# Patient Record
Sex: MALE | Race: BLACK OR AFRICAN AMERICAN | NOT HISPANIC OR LATINO | Employment: OTHER | URBAN - METROPOLITAN AREA
[De-identification: names, ages, dates, MRNs, and addresses within clinical notes are randomized per-mention and may not be internally consistent; named-entity substitution may affect disease eponyms.]

---

## 2020-10-25 ENCOUNTER — HOSPITAL ENCOUNTER (EMERGENCY)
Facility: HOSPITAL | Age: 45
Discharge: HOME | End: 2020-10-25
Attending: EMERGENCY MEDICINE
Payer: MEDICAID

## 2020-10-25 VITALS
OXYGEN SATURATION: 100 % | TEMPERATURE: 100 F | WEIGHT: 159.5 LBS | HEART RATE: 82 BPM | SYSTOLIC BLOOD PRESSURE: 136 MMHG | BODY MASS INDEX: 26.57 KG/M2 | DIASTOLIC BLOOD PRESSURE: 82 MMHG | HEIGHT: 65 IN | RESPIRATION RATE: 20 BRPM

## 2020-10-25 DIAGNOSIS — M79.601 ARM PAIN, RIGHT: ICD-10-CM

## 2020-10-25 DIAGNOSIS — S42.411A RIGHT SUPRACONDYLAR HUMERUS FRACTURE, CLOSED, INITIAL ENCOUNTER: Primary | ICD-10-CM

## 2020-10-25 PROCEDURE — 99283 EMERGENCY DEPT VISIT LOW MDM: CPT | Mod: 25

## 2020-10-25 PROCEDURE — 29105 APPLICATION LONG ARM SPLINT: CPT | Mod: RT

## 2020-10-25 PROCEDURE — 25000003 PHARM REV CODE 250: Performed by: EMERGENCY MEDICINE

## 2020-10-25 RX ORDER — HYDROCODONE BITARTRATE AND ACETAMINOPHEN 5; 325 MG/1; MG/1
1 TABLET ORAL EVERY 4 HOURS PRN
Qty: 18 TABLET | Refills: 0 | Status: SHIPPED | OUTPATIENT
Start: 2020-10-25

## 2020-10-25 RX ORDER — HYDROCODONE BITARTRATE AND ACETAMINOPHEN 5; 325 MG/1; MG/1
2 TABLET ORAL
Status: COMPLETED | OUTPATIENT
Start: 2020-10-25 | End: 2020-10-25

## 2020-10-25 RX ADMIN — HYDROCODONE BITARTRATE AND ACETAMINOPHEN 2 TABLET: 5; 325 TABLET ORAL at 04:10

## 2020-10-25 NOTE — ED PROVIDER NOTES
SCRIBE #1 NOTE: I, Da Smith, am scribing for, and in the presence of, Tyrese Wong MD. I have scribed the entire note.      History      Chief Complaint   Patient presents with    Arm Pain     Right arm pain fell last night       Review of patient's allergies indicates:  No Known Allergies     HPI   HPI    10/25/2020, 3:49 AM   History obtained from the patient      History of Present Illness: Yobani Walls is a 45 y.o. male patient who presents to the Emergency Department for Right arm pain which onset suddenly 7-8 hours ago. Pt states he was walking his dog and he was pulled by the leash to where he landed on his R arm. Symptoms are constant and moderate in severity. No mitigating or exacerbating factors reported. Associated sxs include decreased ROM of R elbow and Increased edema of R elbow. Patient denies any fever, chills, extremity weakness, extremity numbness, decreased ROM of R wrist, increased warmth to the site, and all other sxs at this time. Prior tx includes loratab with no relief. No further complaints or concerns at this time.         Arrival mode: Personal vehicle     PCP: Laurie Alston MD       Past Medical History:  History reviewed. No pertinent medical history.     Past Surgical History:  History reviewed. No pertinent surgical history.     Family History:  History reviewed. No pertinent family history.     Social History:  Social History     Tobacco Use    Smoking status: Unknown   Substance and Sexual Activity    Alcohol use: Unknown    Drug use: Unknown    Sexual activity: Unknown       ROS   Review of Systems   Constitutional: Negative for chills and fever.   HENT: Negative for sore throat.    Respiratory: Negative for shortness of breath.    Cardiovascular: Negative for chest pain.   Gastrointestinal: Negative for nausea.   Genitourinary: Negative for dysuria.   Musculoskeletal: Negative for back pain.        (+) R arm pain  (+) decreased ROM of R elbow  (+) increased  edema of R elbow  (-) decreased ROM of R wrist  (-) increased warmth of the site   Skin: Negative for rash.   Neurological: Negative for weakness and numbness.   Hematological: Does not bruise/bleed easily.   All other systems reviewed and are negative.    Physical Exam      Initial Vitals [10/25/20 0246]   BP Pulse Resp Temp SpO2   (!) 141/89 77 20 99.9 °F (37.7 °C) 97 %      MAP       --          Physical Exam  Nursing Notes and Vital Signs Reviewed.  Constitutional: Patient is in no acute distress. Well-developed and well-nourished.  Head: Atraumatic. Normocephalic.  Eyes: PERRL. EOM intact. Conjunctivae are not pale. No scleral icterus.  ENT: Mucous membranes are moist. Oropharynx is clear and symmetric.    Neck: Supple. Full ROM. No lymphadenopathy.  Cardiovascular: Regular rate. Regular rhythm. No murmurs, rubs, or gallops. Distal pulses are 2+ and symmetric.  Pulmonary/Chest: No respiratory distress. Clear to auscultation bilaterally. No wheezing or rales.  Abdominal: Soft and non-distended.  There is no tenderness.  No rebound, guarding, or rigidity. Good bowel sounds.  Genitourinary: No CVA tenderness  Musculoskeletal: Moves all extremities. No edema. No calf tenderness.  RUE: Tenderness of the R distal humerus and R proximal forearm. Cap refill distally is <2 seconds. Radial pulses are equal and 2+ bilaterally. No motor deficit. No distal sensory deficit. NVI distally.   Skin: Warm and dry.  Neurological:  Alert, awake, and appropriate.  Normal speech.  No acute focal neurological deficits are appreciated.  Psychiatric: Normal affect. Good eye contact. Appropriate in content.    ED Course    Orthopedic Injury    Date/Time: 10/25/2020 4:35 AM  Performed by: Tyrese Wong MD  Authorized by: Tyrese Wong MD     Location procedure was performed:  [facility] EMERGENCY DEPARTMENT  Consent Done?:  Yes  Universal Protocol:     Verbal consent obtained?: Yes      Risks and benefits: Risks, benefits and  alternatives were discussed      Consent given by:  Patient    Patient states understanding of procedure being performed: Yes      Patient's understanding of procedure matches consent: Yes      Patient identity confirmed:   and verbally with patient  Injury:     Injury location: supracondylar fracture.      Pre-procedure assessment:     Neurovascular status: Neurovascularly intact      Distal perfusion: normal      Neurological function: normal      Range of motion: reduced      Local anesthesia used?: No      Patient sedated?: No        Selections made in this section will also lock the Injury type section above.:     Immobilization:  Sling    Splint type:  Long arm (posterior)    Complications: No      Specimens: No      Implants: No    Post-procedure assessment:     Neurovascular status: Neurovascularly intact      Distal perfusion: normal      Neurological function: normal      Range of motion: normal      Patient tolerance:  Patient tolerated the procedure well with no immediate complications  Splint Application    Date/Time: 10/25/2020 4:37 AM  Performed by: Tyrese Wong MD  Authorized by: Tyrese Wong MD   Consent Done: Yes  Consent: Verbal consent obtained.  Risks and benefits: risks, benefits and alternatives were discussed  Consent given by: patient  Patient understanding: patient states understanding of the procedure being performed  Patient consent: the patient's understanding of the procedure matches consent given  Patient identity confirmed:  and verbally with patient  Location details: right elbow (supracondylar fracture)  Splint type: long arm (posterior)  Post-procedure: The splinted body part was neurovascularly unchanged following the procedure.  Patient tolerance: Patient tolerated the procedure well with no immediate complications        ED Vital Signs:  Vitals:    10/25/20 0246 10/25/20 0401 10/25/20 0509   BP: (!) 141/89  136/82   Pulse: 77  82   Resp: 20 18 20   Temp: 99.9  "°F (37.7 °C)     TempSrc: Oral     SpO2: 97%  100%   Weight: 72.3 kg (159 lb 8 oz)     Height: 5' 5" (1.651 m)         Imaging Results:  Imaging Results          X-Ray Forearm Right (Final result)  Result time 10/25/20 07:31:55    Final result by Shola Calvo III, MD (10/25/20 07:31:55)                 Impression:      No acute bony abnormality suggested.      Electronically signed by: Shola Calvo MD  Date:    10/25/2020  Time:    07:31             Narrative:    EXAMINATION:  XR FOREARM RIGHT    CLINICAL HISTORY:  Pain in right arm    COMPARISON:  None    FINDINGS:  No fracture.  No lytic or blastic change.  No radiopaque foreign body.                               4:33 AM: Per ED physician, pt's X-Ray Forearm Right results: Faint posterior fat pad sign, suspect supracondylar fracture.           The Emergency Provider reviewed the vital signs and test results, which are outlined above.    ED Discussion     4:38 AM: Reassessed pt at this time.  Imagining obtained and reviewed, pain meds prescribed, and routine f/u given to the patient at this time. Pt states his condition has improved at this time. Discussed with pt all pertinent ED information and results. Discussed pt dx and plan of tx. Gave pt all f/u and return to the ED instructions. All questions and concerns were addressed at this time. Pt expresses understanding of information and instructions, and is comfortable with plan to discharge. Pt is stable for discharge.    I discussed with patient and/or family/caretaker that evaluation in the ED does not suggest any emergent or life threatening medical conditions requiring immediate intervention beyond what was provided in the ED, and I believe patient is safe for discharge.  Regardless, an unremarkable evaluation in the ED does not preclude the development or presence of a serious of life threatening condition. As such, patient was instructed to return immediately for any worsening or change in " current symptoms.    Driving or other activities under influence of medications - Patient and/or family/caretaker was given a prescription for, or instructed to use a medicine that may impair ability to drive, operate machinery, or participate in other potentially dangerous activities.  Patient was instructed not to participate in these activities while under the influence of these medications.           ED Medication(s):  Medications   HYDROcodone-acetaminophen 5-325 mg per tablet 2 tablet (2 tablets Oral Given 10/25/20 0401)     Discharge Medication List as of 10/25/2020  4:36 AM      START taking these medications    Details   HYDROcodone-acetaminophen (NORCO) 5-325 mg per tablet Take 1 tablet by mouth every 4 (four) hours as needed for Pain., Starting Sun 10/25/2020, Print              Medication List      START taking these medications    HYDROcodone-acetaminophen 5-325 mg per tablet  Commonly known as: NORCO  Take 1 tablet by mouth every 4 (four) hours as needed for Pain.           Where to Get Your Medications      You can get these medications from any pharmacy    Bring a paper prescription for each of these medications  · HYDROcodone-acetaminophen 5-325 mg per tablet         Follow-up Information     Laurie Alston MD In 2 days.    Specialty: Family Medicine  Contact information:  [address]  [place] [address]  [phone]             Gaurav Chavarria DO In 2 days.    Specialty: Orthopedic Surgery  Why: or with any other orthopedist in your primary care network  Contact information:  [address] [place] [address] [address] [place] [address]  [phone]                     Medical Decision Making    Medical Decision Making:   Clinical Tests:   Radiological Study: Ordered and Reviewed           Scribe Attestation:   Scribe #1: I performed the above scribed service and the documentation accurately describes the services I performed. I attest to the accuracy of the note.    Attending:    Physician Attestation Statement for Scribe #1: I, Tyrese Wong MD, personally performed the services described in this documentation, as scribed by Da Smith, in my presence, and it is both accurate and complete.          Clinical Impression       ICD-10-CM ICD-9-CM   1. Right supracondylar humerus fracture, closed, initial encounter  S42.411A 812.41   2. Arm pain, right  M79.601 729.5       Disposition:   Disposition: Discharged  Condition: Stable         Tyrese Wong MD  10/25/20 4724

## 2020-10-26 ENCOUNTER — TELEPHONE (OUTPATIENT)
Dept: ORTHOPEDICS | Facility: CLINIC | Age: 45
End: 2020-10-26

## 2020-10-27 ENCOUNTER — TELEPHONE (OUTPATIENT)
Dept: ORTHOPEDICS | Facility: CLINIC | Age: 45
End: 2020-10-27

## 2025-07-06 ENCOUNTER — HOSPITAL ENCOUNTER (INPATIENT)
Facility: HOSPITAL | Age: 50
LOS: 4 days | Discharge: HOME | DRG: 065 | End: 2025-07-10
Attending: STUDENT IN AN ORGANIZED HEALTH CARE EDUCATION/TRAINING PROGRAM | Admitting: PSYCHIATRY & NEUROLOGY
Payer: MEDICARE

## 2025-07-06 DIAGNOSIS — I63.131 EMBOLIC STROKE INVOLVING RIGHT CAROTID ARTERY: ICD-10-CM

## 2025-07-06 DIAGNOSIS — I63.9 STROKE: ICD-10-CM

## 2025-07-06 PROBLEM — N18.9 CKD (CHRONIC KIDNEY DISEASE): Status: ACTIVE | Noted: 2025-07-06

## 2025-07-06 PROBLEM — I25.2 HISTORY OF MI (MYOCARDIAL INFARCTION): Status: ACTIVE | Noted: 2025-07-06

## 2025-07-06 PROBLEM — E78.5 HYPERLIPIDEMIA: Status: ACTIVE | Noted: 2025-07-06

## 2025-07-06 PROBLEM — Z87.39 HISTORY OF MUSCLE SPASM: Status: ACTIVE | Noted: 2025-07-06

## 2025-07-06 PROBLEM — I10 HYPERTENSION: Status: ACTIVE | Noted: 2025-07-06

## 2025-07-06 PROBLEM — I50.9 CHF (CONGESTIVE HEART FAILURE): Status: ACTIVE | Noted: 2025-07-06

## 2025-07-06 PROBLEM — N17.9 AKI (ACUTE KIDNEY INJURY): Status: ACTIVE | Noted: 2025-07-06

## 2025-07-06 LAB
CHOLEST SERPL-MCNC: 222 MG/DL (ref 120–199)
CHOLEST/HDLC SERPL: 4.4 {RATIO} (ref 2–5)
EAG (OHS): 105 MG/DL (ref 68–131)
HBA1C MFR BLD: 5.3 % (ref 4–5.6)
HDLC SERPL-MCNC: 50 MG/DL (ref 40–75)
HDLC SERPL: 22.5 % (ref 20–50)
LDLC SERPL CALC-MCNC: 136 MG/DL (ref 63–159)
NONHDLC SERPL-MCNC: 172 MG/DL
TRIGL SERPL-MCNC: 180 MG/DL (ref 30–150)
TSH SERPL-ACNC: 1.86 UIU/ML (ref 0.4–4)

## 2025-07-06 PROCEDURE — 99900035 HC TECH TIME PER 15 MIN (STAT)

## 2025-07-06 PROCEDURE — 84443 ASSAY THYROID STIM HORMONE: CPT

## 2025-07-06 PROCEDURE — 11000001 HC ACUTE MED/SURG PRIVATE ROOM

## 2025-07-06 PROCEDURE — 99223 1ST HOSP IP/OBS HIGH 75: CPT | Mod: ,,, | Performed by: PSYCHIATRY & NEUROLOGY

## 2025-07-06 PROCEDURE — 93010 ELECTROCARDIOGRAM REPORT: CPT | Mod: ,,, | Performed by: INTERNAL MEDICINE

## 2025-07-06 PROCEDURE — 80061 LIPID PANEL: CPT

## 2025-07-06 PROCEDURE — 94761 N-INVAS EAR/PLS OXIMETRY MLT: CPT

## 2025-07-06 PROCEDURE — 25000003 PHARM REV CODE 250

## 2025-07-06 PROCEDURE — 25000003 PHARM REV CODE 250: Performed by: NURSE PRACTITIONER

## 2025-07-06 PROCEDURE — 94799 UNLISTED PULMONARY SVC/PX: CPT

## 2025-07-06 PROCEDURE — 83036 HEMOGLOBIN GLYCOSYLATED A1C: CPT

## 2025-07-06 PROCEDURE — 93005 ELECTROCARDIOGRAM TRACING: CPT

## 2025-07-06 RX ORDER — ASPIRIN 81 MG/1
81 TABLET ORAL DAILY
Status: DISCONTINUED | OUTPATIENT
Start: 2025-07-07 | End: 2025-07-10 | Stop reason: HOSPADM

## 2025-07-06 RX ORDER — PANTOPRAZOLE SODIUM 40 MG/1
40 TABLET, DELAYED RELEASE ORAL DAILY
Status: DISCONTINUED | OUTPATIENT
Start: 2025-07-06 | End: 2025-07-10 | Stop reason: HOSPADM

## 2025-07-06 RX ORDER — SODIUM CHLORIDE 9 MG/ML
INJECTION, SOLUTION INTRAVENOUS CONTINUOUS
Status: DISCONTINUED | OUTPATIENT
Start: 2025-07-06 | End: 2025-07-07

## 2025-07-06 RX ORDER — BISACODYL 10 MG/1
10 SUPPOSITORY RECTAL DAILY PRN
Status: DISCONTINUED | OUTPATIENT
Start: 2025-07-06 | End: 2025-07-10 | Stop reason: HOSPADM

## 2025-07-06 RX ORDER — ONDANSETRON HYDROCHLORIDE 2 MG/ML
4 INJECTION, SOLUTION INTRAVENOUS EVERY 12 HOURS PRN
Status: DISCONTINUED | OUTPATIENT
Start: 2025-07-06 | End: 2025-07-10 | Stop reason: HOSPADM

## 2025-07-06 RX ORDER — SODIUM CHLORIDE 0.9 % (FLUSH) 0.9 %
10 SYRINGE (ML) INJECTION
Status: DISCONTINUED | OUTPATIENT
Start: 2025-07-06 | End: 2025-07-10 | Stop reason: HOSPADM

## 2025-07-06 RX ORDER — CLOPIDOGREL BISULFATE 75 MG/1
75 TABLET ORAL DAILY
Status: DISCONTINUED | OUTPATIENT
Start: 2025-07-07 | End: 2025-07-10 | Stop reason: HOSPADM

## 2025-07-06 RX ORDER — ASPIRIN 325 MG
325 TABLET, DELAYED RELEASE (ENTERIC COATED) ORAL ONCE
Status: COMPLETED | OUTPATIENT
Start: 2025-07-06 | End: 2025-07-06

## 2025-07-06 RX ORDER — ATORVASTATIN CALCIUM 40 MG/1
40 TABLET, FILM COATED ORAL DAILY
Status: DISCONTINUED | OUTPATIENT
Start: 2025-07-07 | End: 2025-07-10 | Stop reason: HOSPADM

## 2025-07-06 RX ORDER — HYDRALAZINE HYDROCHLORIDE 20 MG/ML
10 INJECTION INTRAMUSCULAR; INTRAVENOUS EVERY 6 HOURS PRN
Status: DISCONTINUED | OUTPATIENT
Start: 2025-07-06 | End: 2025-07-10 | Stop reason: HOSPADM

## 2025-07-06 RX ORDER — BACLOFEN 5 MG/1
5 TABLET ORAL 3 TIMES DAILY PRN
Status: DISCONTINUED | OUTPATIENT
Start: 2025-07-06 | End: 2025-07-10 | Stop reason: HOSPADM

## 2025-07-06 RX ADMIN — ASPIRIN 325 MG: 325 TABLET, COATED ORAL at 05:07

## 2025-07-06 RX ADMIN — PANTOPRAZOLE SODIUM 40 MG: 40 TABLET, DELAYED RELEASE ORAL at 08:07

## 2025-07-06 RX ADMIN — SODIUM CHLORIDE: 9 INJECTION, SOLUTION INTRAVENOUS at 08:07

## 2025-07-06 RX ADMIN — BACLOFEN 5 MG: 5 TABLET ORAL at 08:07

## 2025-07-06 NOTE — NURSING
Patient Transferred to NPU Room 926 @1545      Upon arrival to the floor, patient greeted and oriented to room. Complete head to toe assessment completed per protocol. VSS, see flowsheet for details. Neuro assessment completed. Cardiac monitoring orders reviewed. Patient added to tele monitor in nursing station, rate and rhythm verified. Primary team notified of patient's transfer to floor. All current and transfer orders reviewed/reconciled per protocol. All emergency equipment set up in patient's room. Fall/seizure precautions initiated per providers orders. 4 Eyes skin assessment performed, see flowsheets for details. Reviewed assessment and rounding frequency with patient and family. Questions were encouraged and addressed. Repositioned patient for comfort with bed locked in lowest position, side rails up x 4, bed alarm set, and call light within reach. Instructed patient to call staff for mobility/assistance, verbalized understanding. No acute signs of distress noted at this time.

## 2025-07-06 NOTE — ASSESSMENT & PLAN NOTE
Patient is on the following at home per chart review from care everywhere: amlodipine 10 mg qd, carvedilol 25 mg qd, lasix 40 mg qd, hydralazine 50 mg unspecified number of times per day.     Plan:   - hold hypertension medications in setting of permissive hypertension, will add back as clinically indicated.

## 2025-07-06 NOTE — HPI
"Yobani Walls is a 50 y.o. 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 LKW 9:00pm 07/05/2026. Patient presented to [facility] this morning with left- sided weakness and bizarre behavior. Wife states that patient "seemed drunk" last night, but this morning had difficulty with driving. Patient was evaluated via telestroke. NIH at that time was 3 for left sided drift and L neglect. CTH/CTA revealed occlusion of the distal intracranial ICA with M1 and QUETA being patent and subsequent reocclusion of the R  inferior M2 segment.  as well as established infarcts in the caudate head and foci in the temporal lobe. Patient was OOW for TNK, IR not recommended due to low NIHSS. Decision made to transfer patient to [facility] for MRI and close monitoring for decompensation. On arrival to floor at [facility], stroke team notified. On exam, patient has significant LUE drift, L facial droop, and dysarthria. NIHSS now 6. Patient stood up to go to the bathroom, nurse noted that at this time, patient nearly slumped to the ground due to severe LSW. STAT MRI, ASA load, and HOB Flat ordered. Patient has a defibrillator, device required interrogation prior to MRI.   "

## 2025-07-06 NOTE — ASSESSMENT & PLAN NOTE
Patient has extensive history of congestive heart failure. Per chart review from care everywhere, he is on the following regimen at home: carvedilol 25 mg qd, entresto  mg, farxiga 10 mg qd, lasix 40 mg qd.     Plan:   - will hold GDMT initially in the setting of permissive hypertension. Will add back as clinically indicated.

## 2025-07-06 NOTE — ASSESSMENT & PLAN NOTE
"Yobani Walls is a 50 y.o. male with PMH of HTN, HLD,  multiple MI, CHF, CKD, prior stroke (with no residual deficits) that presents as a transfer from [facility] with R intracranial ICA and R M2 occlusion. LKW 9:00pm 07/05/2026. Patient presented to [facility] this morning with left- sided weakness and bizarre behavior. Wife states that patient "seemed drunk" last night, but this morning had difficulty with driving. Patient was evaluated via telestroke. NIH at that time was 3 for left sided drift and L neglect. CTH/CTA revealed occlusion of the distal intracranial ICA with M1 and QUETA being patent and subsequent reocclusion of the R  inferior M2 segment.  as well as established infarcts in the caudate head and foci in the temporal lobe. Patient was OOW for TNK, IR not recommended due to low NIHSS. Decision made to [place] [facility] [place] and close monitoring for decompensation. On arrival [place] at [facility], stroke team notified. On exam, patient has significant LUE drift, L facial droop, and dysarthria. NIHSS now 6. Patient stood up to go to the bathroom, nurse noted that at this time, patient nearly slumped to the ground due to severe LSW. STAT MRI, ASA load, and HOB Flat ordered. Patient has a defibrillator, device required interrogation prior to MRI.     Discussed worsening exam with VN attending. Will obtain MRI prior to making decision on thrombectomy. Delay in obtaining MRI due to need for device interrogation. Interrogation complete and device switched to MRI mode by cardiology fellow, however, MRI staff requested device information. Device information included in a care update by cardiology fellow, however, MRI staff then stated protocol requires device to be programmed in MRI, not on the floor. This caused further delay in getting patient to MRI. Cardiology fellow and radiology on call notified to expedite MRI process.     Antithrombotics for secondary stroke prevention: Antiplatelets: Aspirin: 81 " mg daily  Clopidogrel: 75 mg daily    Statins for secondary stroke prevention and hyperlipidemia, if present:   Statins: Atorvastatin- 40 mg daily    Aggressive risk factor modification: HTN, HLD, Diet, Exercise     Rehab efforts: The patient has been evaluated by a stroke team provider and the therapy needs have been fully considered based off the presenting complaints and exam findings. The following therapy evaluations are needed: PT evaluate and treat, OT evaluate and treat, SLP evaluate and treat, PM&R evaluate for appropriate placement    Diagnostics ordered/pending: MRI head without contrast to assess brain parenchyma, TTE to assess cardiac function/status     VTE prophylaxis: Heparin 5000 units SQ every 8 hours  Mechanical prophylaxis: Place SCDs    BP parameters: Infarct: No intervention, SBP <220

## 2025-07-06 NOTE — SUBJECTIVE & OBJECTIVE
No past medical history on file.  No past surgical history on file.  Social History[1]  Review of patient's allergies indicates:  No Known Allergies    Medications: I have reviewed the current medication administration record.    Prescriptions Prior to Admission[2]    Review of Systems   Eyes:  Negative for visual disturbance.   Neurological:  Positive for facial asymmetry, speech difficulty and weakness. Negative for numbness.     Objective:     Vital Signs (Most Recent):  Temp: 98.2 °F (36.8 °C) (07/06/25 1548)  Pulse: (!) 52 (07/06/25 1629)  Resp: 17 (07/06/25 1629)  BP: (!) 163/97 (07/06/25 1548)  SpO2: 99 % (07/06/25 1629)    Vital Signs Range (Last 24H):  Temp:  [98.2 °F (36.8 °C)]   Pulse:  [49-56]   Resp:  [15-20]   BP: (147-179)/()   SpO2:  [99 %-100 %]        Physical Exam  HENT:      Head: Normocephalic and atraumatic.      Mouth/Throat:      Mouth: Mucous membranes are moist.   Eyes:      Extraocular Movements: Extraocular movements intact.      Conjunctiva/sclera: Conjunctivae normal.   Cardiovascular:      Rate and Rhythm: Normal rate.   Pulmonary:      Effort: Pulmonary effort is normal.   Skin:     General: Skin is warm and dry.   Neurological:      Mental Status: He is alert and oriented to person, place, and time.      Motor: Weakness present.              Neurological Exam:   LOC: alert  Attention Span: Good   Language: No aphasia  Articulation: Dysarthria  Orientation: Person, Place, Time   Visual Fields: Full  EOM (CN III, IV, VI): Full/intact  Facial Movement (CN VII): Lower facial weakness on the Left  Motor: Arm left  Paresis: 2/5  Leg left  Paresis: 4/5  Arm right  Normal 5/5  Leg right Normal 5/5  Sensation: intact to light touch       Laboratory:  CMP:   Recent Labs   Lab 07/06/25  1251   CALCIUM 9.5   ALBUMIN 4.3   PROT 7.0      K 4.1   CO2 28      BUN 15   CREATININE 2.16*   ALKPHOS 47   ALT 11   AST 22   BILITOT 1.0     CBC:   Recent Labs   Lab 07/06/25  1251    WBC 5.40   RBC 5.58   HGB 16.1   HCT 47.8      MCV 86   MCH 28.8   MCHC 33.6     Lipid Panel:   Recent Labs   Lab 07/06/25  1630   CHOL 222*   LDLCALC 136.0   HDL 50   TRIG 180*     Coagulation:   Recent Labs   Lab 07/06/25  1251   INR 1.0   APTT 26.1     Hgb A1C:   Recent Labs   Lab 07/06/25  1632   HGBA1C 5.3     TSH:   Recent Labs   Lab 07/06/25  1630   TSH 1.861       Diagnostic Results:      Brain imaging/Vessel Imaging  MRI Brain without contrast pending     CTA Head and Neck OSH 07/06/2025  1. Occluded distal right ICA.  2. Severe stenosis right and left M2 segments.  3. Subjectively decreased enhancement the right sylvian branches.     Tapered narrowing of the mid/distal ICA resulting in complete occlusion.     CT Head OSH 07/06/2025  Acute/subacute infarct right head of the caudate nucleus.       Cardiac Evaluation:   Echo pending          [1]   Social History  Tobacco Use    Smoking status: Unknown   [2]   Medications Prior to Admission   Medication Sig Dispense Refill Last Dose/Taking    HYDROcodone-acetaminophen (NORCO) 5-325 mg per tablet Take 1 tablet by mouth every 4 (four) hours as needed for Pain. 18 tablet 0

## 2025-07-06 NOTE — ASSESSMENT & PLAN NOTE
History of chronic kidney disease noted.     Plan:   - avoid nephrotoxic medications   - renally dose medications   - monitor with daily labs

## 2025-07-06 NOTE — CARE UPDATE
Care Update   - 51 Y/O M with ICD -Medtronic presented with Facial droop. Cardiology contact to change device setting for MRI compatible mode.       Device name: Medtronic - ICD   Mode changed to OVO  Implant date: 2/25/2019   ICD-VR [id number] VISIA AF MRI US DF1  LEAD [id number] SPRINT QUATTRO US     VS 99%    <0.1%   AF <0.1%         Pt is cleared to go for MRI as the mode has been changed. Please call [phone]; once pt is done with MRI so that the mode can be changed back to VVI. And MRI mode changed back.   Uday BROWN Obi, MD - PGY- IV  Department of Cardiovascular disease   [facility]

## 2025-07-06 NOTE — PROGRESS NOTES
"Loi Larson - Neurosurgery ([facility])  Vascular Neurology  Comprehensive Stroke Center  Progress Note    Assessment/Plan:     * Embolic stroke involving right carotid artery  Yobani Walls is a 50 y.o. male with PMH of HTN, HLD,  multiple MI, CHF, CKD, prior stroke (with no residual deficits) that presents as a transfer from [facility] with R intracranial ICA and R M2 occlusion. LKW 9:00pm 07/05/2026. Patient presented to [facility] this morning with left- sided weakness and bizarre behavior. Wife states that patient "seemed drunk" last night, but this morning had difficulty with driving. Patient was evaluated via telestroke. NIH at that time was 3 for left sided drift and L neglect. CTH/CTA revealed occlusion of the distal intracranial ICA with M1 and QUETA being patent and subsequent reocclusion of the R  inferior M2 segment.  as well as established infarcts in the caudate head and foci in the temporal lobe. Patient was OOW for TNK, IR not recommended due to low NIHSS. Decision made to [place] [facility] [place] and close monitoring for decompensation. On arrival [place] at [facility], stroke team notified. On exam, patient has significant LUE drift, L facial droop, and dysarthria. NIHSS now 6. Patient stood up to go to the bathroom, nurse noted that at this time, patient nearly slumped to the ground due to severe LSW. STAT MRI, ASA load, and HOB Flat ordered. Patient has a defibrillator, device required interrogation prior to MRI.     Discussed worsening exam with VN attending. Will obtain MRI prior to making decision on thrombectomy. Delay in obtaining MRI due to need for device interrogation. Interrogation complete and device switched to MRI mode by cardiology fellow, however, MRI staff requested device information. Device information included in a care update by cardiology fellow, however, MRI staff then stated protocol requires device to be programmed in MRI, not on the floor. This caused further delay in " getting patient to MRI. Cardiology fellow and radiology on call notified to expedite MRI process.     Antithrombotics for secondary stroke prevention: Antiplatelets: Aspirin: 81 mg daily  Clopidogrel: 75 mg daily    Statins for secondary stroke prevention and hyperlipidemia, if present:   Statins: Atorvastatin- 40 mg daily    Aggressive risk factor modification: HTN, HLD, Diet, Exercise     Rehab efforts: The patient has been evaluated by a stroke team provider and the therapy needs have been fully considered based off the presenting complaints and exam findings. The following therapy evaluations are needed: PT evaluate and treat, OT evaluate and treat, SLP evaluate and treat, PM&R evaluate for appropriate placement    Diagnostics ordered/pending: MRI head without contrast to assess brain parenchyma, TTE to assess cardiac function/status     VTE prophylaxis: Heparin 5000 units SQ every 8 hours  Mechanical prophylaxis: Place SCDs    BP parameters: Infarct: No intervention, SBP <220        CKD (chronic kidney disease)  History of chronic kidney disease noted.     Plan:   - avoid nephrotoxic medications   - renally dose medications   - monitor with daily labs     Hypertension  Patient is on the following at home per chart review from care everywhere: amlodipine 10 mg qd, carvedilol 25 mg qd, lasix 40 mg qd, hydralazine 50 mg unspecified number of times per day.     Plan:   - hold hypertension medications in setting of permissive hypertension, will add back as clinically indicated.     Hyperlipidemia  Patient is on the following at home per care everywhere chart review: rosuvastatin 40 mg qd, fenofibrate 145 mg unspecified number of times per day.     Plan:   - will substitute rosuvastatin for atorvastatin   - continue fenofibrate         CHF (congestive heart failure)  Patient has extensive history of congestive heart failure. Per chart review from care everywhere, he is on the following regimen at home: carvedilol 25  mg qd, entresto  mg, farxiga 10 mg qd, lasix 40 mg qd.     Plan:   - will hold GDMT initially in the setting of permissive hypertension. Will add back as clinically indicated.     CAREY (acute kidney injury)  -sCr 2.16  -Consider fluids but will defer due to heart failure   -Avoid nephrotoxins and renally dose meds as appropriate  -Monitor UOP  -Daily CMP to monitor kidney function          No notes on file    STROKE DOCUMENTATION        NIH Scale:  Interval: baseline  1a. Level of Consciousness: 0-->Alert, keenly responsive  1b. LOC Questions: 0-->Answers both questions correctly  1c. LOC Commands: 0-->Performs both tasks correctly  2. Best Gaze: 0-->Normal  3. Visual: 0-->No visual loss  4. Facial Palsy: 2-->Partial paralysis (total or near-total paralysis of lower face)  5a. Motor Arm, Left: 2-->Some effort against gravity, limb cannot get to or maintain (if cued) 90 (or 45) degrees, drifts down to bed, but has some effort against gravity  5b. Motor Arm, Right: 0-->No drift, limb holds 90 (or 45) degrees for full 10 secs  6a. Motor Leg, Left: 1-->Drift, leg falls by the end of the 5-sec period but does not hit bed  6b. Motor Leg, Right: 0-->No drift, leg holds 30 degree position for full 5 secs  7. Limb Ataxia: 0-->Absent  8. Sensory: 0-->Normal, no sensory loss  9. Best Language: 0-->No aphasia, normal  10. Dysarthria: 1-->Mild-to-moderate dysarthria, patient slurs at least some words and, at worst, can be understood with some difficulty  11. Extinction and Inattention (formerly Neglect): 0-->No abnormality  Total (NIH Stroke Scale): 6       Modified [place] Score: 0  [place] Coma Scale:    ABCD2 Score:    [id number] Score:   HAS -BLED Score:   ICH Score:   Hunt & Hackett Classification:      Hemorrhagic change of an Ischemic Stroke: Does this patient have an ischemic stroke with hemorrhagic changes? No           No past medical history on file.  No past surgical history on file.  Social History[1]  Review of  patient's allergies indicates:  No Known Allergies    Medications: I have reviewed the current medication administration record.    Prescriptions Prior to Admission[2]    Review of Systems   Eyes:  Negative for visual disturbance.   Neurological:  Positive for facial asymmetry, speech difficulty and weakness. Negative for numbness.     Objective:     Vital Signs (Most Recent):  Temp: 98.2 °F (36.8 °C) (07/06/25 1548)  Pulse: (!) 52 (07/06/25 1629)  Resp: 17 (07/06/25 1629)  BP: (!) 163/97 (07/06/25 1548)  SpO2: 99 % (07/06/25 1629)    Vital Signs Range (Last 24H):  Temp:  [98.2 °F (36.8 °C)]   Pulse:  [49-56]   Resp:  [15-20]   BP: (147-179)/()   SpO2:  [99 %-100 %]        Physical Exam  HENT:      Head: Normocephalic and atraumatic.      Mouth/Throat:      Mouth: Mucous membranes are moist.   Eyes:      Extraocular Movements: Extraocular movements intact.      Conjunctiva/sclera: Conjunctivae normal.   Cardiovascular:      Rate and Rhythm: Normal rate.   Pulmonary:      Effort: Pulmonary effort is normal.   Skin:     General: Skin is warm and dry.   Neurological:      Mental Status: He is alert and oriented to person, place, and time.      Motor: Weakness present.              Neurological Exam:   LOC: alert  Attention Span: Good   Language: No aphasia  Articulation: Dysarthria  Orientation: Person, Place, Time   Visual Fields: Full  EOM (CN III, IV, VI): Full/intact  Facial Movement (CN VII): Lower facial weakness on the Left  Motor: Arm left  Paresis: 2/5  Leg left  Paresis: 4/5  Arm right  Normal 5/5  Leg right Normal 5/5  Sensation: intact to light touch       Laboratory:  CMP:   Recent Labs   Lab 07/06/25  1251   CALCIUM 9.5   ALBUMIN 4.3   PROT 7.0      K 4.1   CO2 28      BUN 15   CREATININE 2.16*   ALKPHOS 47   ALT 11   AST 22   BILITOT 1.0     CBC:   Recent Labs   Lab 07/06/25  1251   WBC 5.40   RBC 5.58   HGB 16.1   HCT 47.8      MCV 86   MCH 28.8   MCHC 33.6     Lipid Panel:    Recent Labs   Lab 07/06/25  1630   CHOL 222*   LDLCALC 136.0   HDL 50   TRIG 180*     Coagulation:   Recent Labs   Lab 07/06/25  1251   INR 1.0   APTT 26.1     Hgb A1C:   Recent Labs   Lab 07/06/25  1632   HGBA1C 5.3     TSH:   Recent Labs   Lab 07/06/25  1630   TSH 1.861       Diagnostic Results:      Brain imaging/Vessel Imaging  MRI Brain without contrast pending     CTA Head and Neck OSH 07/06/2025  1. Occluded distal right ICA.  2. Severe stenosis right and left M2 segments.  3. Subjectively decreased enhancement the right sylvian branches.     Tapered narrowing of the mid/distal ICA resulting in complete occlusion.     CT Head OSH 07/06/2025  Acute/subacute infarct right head of the caudate nucleus.       Cardiac Evaluation:   Echo pending       Yohana Hermosillo PA-C  Comprehensive Stroke Center  Department of Vascular Neurology   [facility])             [1]   Social History  Tobacco Use    Smoking status: Unknown   [2]   Medications Prior to Admission   Medication Sig Dispense Refill Last Dose/Taking    HYDROcodone-acetaminophen (NORCO) 5-325 mg per tablet Take 1 tablet by mouth every 4 (four) hours as needed for Pain. 18 tablet 0

## 2025-07-06 NOTE — ASSESSMENT & PLAN NOTE
-sCr 2.16  -Consider fluids but will defer due to heart failure   -Avoid nephrotoxins and renally dose meds as appropriate  -Monitor UOP  -Daily CMP to monitor kidney function

## 2025-07-06 NOTE — ASSESSMENT & PLAN NOTE
Patient is on the following at home per care everywhere chart review: rosuvastatin 40 mg qd, fenofibrate 145 mg unspecified number of times per day.     Plan:   - will substitute rosuvastatin for atorvastatin   - continue fenofibrate

## 2025-07-07 PROBLEM — N17.9 AKI (ACUTE KIDNEY INJURY): Status: RESOLVED | Noted: 2025-07-06 | Resolved: 2025-07-07

## 2025-07-07 PROBLEM — E87.6 HYPOKALEMIA: Status: ACTIVE | Noted: 2025-07-07

## 2025-07-07 LAB
ABSOLUTE EOSINOPHIL (OHS): 0.09 K/UL
ABSOLUTE MONOCYTE (OHS): 0.6 K/UL (ref 0.3–1)
ABSOLUTE NEUTROPHIL COUNT (OHS): 3.96 K/UL (ref 1.8–7.7)
ALBUMIN SERPL BCP-MCNC: 3.8 G/DL (ref 3.5–5.2)
ALP SERPL-CCNC: 41 UNIT/L (ref 40–150)
ALT SERPL W/O P-5'-P-CCNC: 8 UNIT/L (ref 10–44)
ANION GAP (OHS): 7 MMOL/L (ref 8–16)
AORTIC SIZE INDEX (SOV): 2.3 CM/M2
AORTIC SIZE INDEX: 2 CM/M2
APTT PPP: 27.3 SECONDS (ref 21–32)
ASCENDING AORTA: 3.5 CM
AST SERPL-CCNC: 15 UNIT/L (ref 11–45)
AV AREA BY CONTINUOUS VTI: 2.3 CM2
AV INDEX (PROSTH): 0.65
AV LVOT MEAN GRADIENT: 1 MMHG
AV LVOT PEAK GRADIENT: 2 MMHG
AV MEAN GRADIENT: 3 MMHG
AV PEAK GRADIENT: 6 MMHG
AV REGURGITATION PRESSURE HALF TIME: 1367 MS
AV VALVE AREA BY VELOCITY RATIO: 1.9 CM²
AV VALVE AREA: 2.5 CM2
AV VELOCITY RATIO: 0.5
BASOPHILS # BLD AUTO: 0.04 K/UL
BASOPHILS NFR BLD AUTO: 0.6 %
BILIRUB SERPL-MCNC: 1 MG/DL (ref 0.1–1)
BSA FOR ECHO PROCEDURE: 1.75 M2
BUN SERPL-MCNC: 14 MG/DL (ref 6–20)
CALCIUM SERPL-MCNC: 9.1 MG/DL (ref 8.7–10.5)
CHLORIDE SERPL-SCNC: 112 MMOL/L (ref 95–110)
CO2 SERPL-SCNC: 25 MMOL/L (ref 23–29)
CREAT SERPL-MCNC: 1.8 MG/DL (ref 0.5–1.4)
CV ECHO LV RWT: 0.32 CM
DOP CALC AO PEAK VEL: 1.2 M/S
DOP CALC AO VTI: 25.5 CM
DOP CALC LVOT AREA: 3.8 CM2
DOP CALC LVOT DIAMETER: 2.2 CM
DOP CALC LVOT PEAK VEL: 0.6 M/S
DOP CALC LVOT STROKE VOLUME: 62.7 CM3
DOP CALCLVOT PEAK VEL VTI: 16.5 CM
E WAVE DECELERATION TIME: 282 MS
E/A RATIO: 0.67
E/E' RATIO: 21 M/S
ECHO EF ESTIMATED: 20 %
ECHO LV POSTERIOR WALL: 0.9 CM (ref 0.6–1.1)
EJECTION FRACTION: 38 %
ERYTHROCYTE [DISTWIDTH] IN BLOOD BY AUTOMATED COUNT: 14.6 % (ref 11.5–14.5)
FRACTIONAL SHORTENING: 8.8 % (ref 28–44)
GFR SERPLBLD CREATININE-BSD FMLA CKD-EPI: 46 ML/MIN/1.73/M2
GLUCOSE SERPL-MCNC: 86 MG/DL (ref 70–110)
HCT VFR BLD AUTO: 47.9 % (ref 40–54)
HGB BLD-MCNC: 15.9 GM/DL (ref 14–18)
IMM GRANULOCYTES # BLD AUTO: 0.01 K/UL (ref 0–0.04)
IMM GRANULOCYTES NFR BLD AUTO: 0.2 % (ref 0–0.5)
INR PPP: 1 (ref 0.8–1.2)
INTERVENTRICULAR SEPTUM: 1 CM (ref 0.6–1.1)
IVRT: 171 MS
LA MAJOR: 5.7 CM
LA MINOR: 5.7 CM
LA WIDTH: 4.3 CM
LEFT ATRIUM SIZE: 3.8 CM
LEFT ATRIUM VOLUME INDEX MOD: 41 ML/M2
LEFT ATRIUM VOLUME INDEX: 45 ML/M2
LEFT ATRIUM VOLUME MOD: 71 ML
LEFT ATRIUM VOLUME: 79 CM3
LEFT INTERNAL DIMENSION IN SYSTOLE: 5.2 CM (ref 2.1–4)
LEFT VENTRICLE DIASTOLIC VOLUME INDEX: 91.95 ML/M2
LEFT VENTRICLE DIASTOLIC VOLUME: 160 ML
LEFT VENTRICLE MASS INDEX: 121.7 G/M2
LEFT VENTRICLE SYSTOLIC VOLUME INDEX: 73.6 ML/M2
LEFT VENTRICLE SYSTOLIC VOLUME: 128 ML
LEFT VENTRICULAR INTERNAL DIMENSION IN DIASTOLE: 5.7 CM (ref 3.5–6)
LEFT VENTRICULAR MASS: 211.7 G
LV LATERAL E/E' RATIO: 18.5
LV SEPTAL E/E' RATIO: 24.7
LYMPHOCYTES # BLD AUTO: 1.56 K/UL (ref 1–4.8)
MAGNESIUM SERPL-MCNC: 1.9 MG/DL (ref 1.6–2.6)
MCH RBC QN AUTO: 28.3 PG (ref 27–31)
MCHC RBC AUTO-ENTMCNC: 33.2 G/DL (ref 32–36)
MCV RBC AUTO: 85 FL (ref 82–98)
MV PEAK A VEL: 1.1 M/S
MV PEAK E VEL: 0.74 M/S
NUCLEATED RBC (/100WBC) (OHS): 0 /100 WBC
OHS CV RV/LV RATIO: 0.49 CM
OHS QRS DURATION: 98 MS
OHS QTC CALCULATION: 423 MS
PHOSPHATE SERPL-MCNC: 2.4 MG/DL (ref 2.7–4.5)
PISA TR MAX VEL: 2.5 M/S
PLATELET # BLD AUTO: 203 K/UL (ref 150–450)
PMV BLD AUTO: 11.5 FL (ref 9.2–12.9)
POTASSIUM SERPL-SCNC: 3.4 MMOL/L (ref 3.5–5.1)
PROT SERPL-MCNC: 6.4 GM/DL (ref 6–8.4)
PROTHROMBIN TIME: 11.4 SECONDS (ref 9–12.5)
RA MAJOR: 4.38 CM
RA PRESSURE ESTIMATED: 3 MMHG
RA WIDTH: 3.34 CM
RBC # BLD AUTO: 5.62 M/UL (ref 4.6–6.2)
RELATIVE EOSINOPHIL (OHS): 1.4 %
RELATIVE LYMPHOCYTE (OHS): 24.9 % (ref 18–48)
RELATIVE MONOCYTE (OHS): 9.6 % (ref 4–15)
RELATIVE NEUTROPHIL (OHS): 63.3 % (ref 38–73)
RIGHT ATRIAL AREA: 11.4 CM2
RIGHT VENTRICLE DIASTOLIC BASEL DIMENSION: 2.8 CM
RV TB RVSP: 6 MMHG
RV TISSUE DOPPLER FREE WALL SYSTOLIC VELOCITY 1 (APICAL 4 CHAMBER VIEW): 9.28 CM/S
SINUS: 4 CM
SODIUM SERPL-SCNC: 144 MMOL/L (ref 136–145)
STJ: 3.3 CM
TDI LATERAL: 0.04 M/S
TDI SEPTAL: 0.03 M/S
TDI: 0.04 M/S
TRICUSPID ANNULAR PLANE SYSTOLIC EXCURSION: 1.8 CM
TROPONIN I SERPL HS-MCNC: 96 NG/L
TV PEAK GRADIENT: 24 MMHG
TV REST PULMONARY ARTERY PRESSURE: 28 MMHG
WBC # BLD AUTO: 6.26 K/UL (ref 3.9–12.7)
Z-SCORE OF LEFT VENTRICULAR DIMENSION IN END DIASTOLE: 1.68
Z-SCORE OF LEFT VENTRICULAR DIMENSION IN END SYSTOLE: 4.4

## 2025-07-07 PROCEDURE — 99233 SBSQ HOSP IP/OBS HIGH 50: CPT | Mod: GC,,, | Performed by: PSYCHIATRY & NEUROLOGY

## 2025-07-07 PROCEDURE — 25000003 PHARM REV CODE 250: Performed by: NURSE PRACTITIONER

## 2025-07-07 PROCEDURE — 84100 ASSAY OF PHOSPHORUS: CPT

## 2025-07-07 PROCEDURE — 85610 PROTHROMBIN TIME: CPT

## 2025-07-07 PROCEDURE — 85730 THROMBOPLASTIN TIME PARTIAL: CPT

## 2025-07-07 PROCEDURE — 11000001 HC ACUTE MED/SURG PRIVATE ROOM

## 2025-07-07 PROCEDURE — 25000003 PHARM REV CODE 250: Performed by: INTERNAL MEDICINE

## 2025-07-07 PROCEDURE — 85025 COMPLETE CBC W/AUTO DIFF WBC: CPT

## 2025-07-07 PROCEDURE — 97165 OT EVAL LOW COMPLEX 30 MIN: CPT

## 2025-07-07 PROCEDURE — 92523 SPEECH SOUND LANG COMPREHEN: CPT

## 2025-07-07 PROCEDURE — 25000003 PHARM REV CODE 250

## 2025-07-07 PROCEDURE — 97530 THERAPEUTIC ACTIVITIES: CPT

## 2025-07-07 PROCEDURE — 80053 COMPREHEN METABOLIC PANEL: CPT

## 2025-07-07 PROCEDURE — 83735 ASSAY OF MAGNESIUM: CPT

## 2025-07-07 PROCEDURE — 94761 N-INVAS EAR/PLS OXIMETRY MLT: CPT

## 2025-07-07 PROCEDURE — 36415 COLL VENOUS BLD VENIPUNCTURE: CPT

## 2025-07-07 PROCEDURE — 84484 ASSAY OF TROPONIN QUANT: CPT

## 2025-07-07 PROCEDURE — 92610 EVALUATE SWALLOWING FUNCTION: CPT

## 2025-07-07 PROCEDURE — 97162 PT EVAL MOD COMPLEX 30 MIN: CPT

## 2025-07-07 RX ORDER — MUPIROCIN 20 MG/G
OINTMENT TOPICAL 2 TIMES DAILY
Status: DISCONTINUED | OUTPATIENT
Start: 2025-07-07 | End: 2025-07-10 | Stop reason: HOSPADM

## 2025-07-07 RX ADMIN — CLOPIDOGREL 75 MG: 75 TABLET ORAL at 09:07

## 2025-07-07 RX ADMIN — BACLOFEN 5 MG: 5 TABLET ORAL at 08:07

## 2025-07-07 RX ADMIN — MUPIROCIN: 20 OINTMENT TOPICAL at 11:07

## 2025-07-07 RX ADMIN — POTASSIUM BICARBONATE 40 MEQ: 391 TABLET, EFFERVESCENT ORAL at 09:07

## 2025-07-07 RX ADMIN — POTASSIUM BICARBONATE 40 MEQ: 391 TABLET, EFFERVESCENT ORAL at 11:07

## 2025-07-07 RX ADMIN — BACLOFEN 5 MG: 5 TABLET ORAL at 11:07

## 2025-07-07 RX ADMIN — ATORVASTATIN CALCIUM 40 MG: 40 TABLET, FILM COATED ORAL at 09:07

## 2025-07-07 RX ADMIN — ASPIRIN 81 MG: 81 TABLET, COATED ORAL at 09:07

## 2025-07-07 RX ADMIN — PANTOPRAZOLE SODIUM 40 MG: 40 TABLET, DELAYED RELEASE ORAL at 09:07

## 2025-07-07 NOTE — H&P
"    Loi Larson - Neurosurgery ([facility])  Vascular Neurology  Comprehensive Stroke Center  History & Physical    Consults  Assessment/Plan:     Patient is a 50 y.o. year old male with:    * Embolic stroke involving right carotid artery  Yobani Walls is a 50 y.o. male with PMH of HTN, HLD,  multiple MI, CHF, CKD, prior stroke (with no residual deficits) that presents as a transfer from [facility] with R intracranial ICA and R M2 occlusion. LKW 9:00pm 07/05/2026. Patient presented to [facility] this morning with left- sided weakness and bizarre behavior. Wife states that patient "seemed drunk" last night, but this morning had difficulty with driving. Patient was evaluated via telestroke. NIH at that time was 3 for left sided drift and L neglect. CTH/CTA revealed occlusion of the distal intracranial ICA with M1 and QUETA being patent and subsequent reocclusion of the R  inferior M2 segment.  as well as established infarcts in the caudate head and foci in the temporal lobe. Patient was OOW for TNK, IR not recommended due to low NIHSS. Decision made to transfer patient to [facility] for [place] and close monitoring for decompensation. On arrival to floor at [facility], stroke team notified. On exam, patient has significant LUE drift, L facial droop, and dysarthria. NIHSS now 6. Patient stood up to go to the bathroom, nurse noted that at this time, patient nearly slumped to the ground due to severe LSW. STAT MRI, ASA load, and HOB Flat ordered. Patient has a defibrillator, device required interrogation prior to MRI.     Discussed worsening exam with VN attending. Will obtain MRI prior to making decision on thrombectomy. Delay in obtaining MRI due to need for device interrogation. Interrogation complete and device switched to MRI mode by cardiology fellow, however, MRI staff requested device information. Device information included in a care update by cardiology fellow, however, MRI staff then stated protocol requires device to be " programmed in MRI, not on the floor. This caused further delay in getting patient to MRI. Cardiology fellow and radiology on call notified to expedite MRI process.     Antithrombotics for secondary stroke prevention: Antiplatelets: Aspirin: 81 mg daily  Clopidogrel: 75 mg daily    Statins for secondary stroke prevention and hyperlipidemia, if present:   Statins: Atorvastatin- 40 mg daily    Aggressive risk factor modification: HTN, HLD, Diet, Exercise     Rehab efforts: The patient has been evaluated by a stroke team provider and the therapy needs have been fully considered based off the presenting complaints and exam findings. The following therapy evaluations are needed: PT evaluate and treat, OT evaluate and treat, SLP evaluate and treat, PM&R evaluate for appropriate placement    Diagnostics ordered/pending: MRI head without contrast to assess brain parenchyma, TTE to assess cardiac function/status     VTE prophylaxis: Heparin 5000 units SQ every 8 hours  Mechanical prophylaxis: Place SCDs    BP parameters: Infarct: No intervention, SBP <220        CKD (chronic kidney disease)  History of chronic kidney disease noted.     Plan:   - avoid nephrotoxic medications   - renally dose medications   - monitor with daily labs     Hypertension  Patient is on the following at home per chart review from care everywhere: amlodipine 10 mg qd, carvedilol 25 mg qd, lasix 40 mg qd, hydralazine 50 mg unspecified number of times per day.     Plan:   - hold hypertension medications in setting of permissive hypertension, will add back as clinically indicated.     Hyperlipidemia  Patient is on the following at home per care everywhere chart review: rosuvastatin 40 mg qd, fenofibrate 145 mg unspecified number of times per day.     Plan:   - will substitute rosuvastatin for atorvastatin   - continue fenofibrate         CHF (congestive heart failure)  Patient has extensive history of congestive heart failure. Per chart review from care  everywhere, he is on the following regimen at home: carvedilol 25 mg qd, entresto  mg, farxiga 10 mg qd, lasix 40 mg qd.     Plan:   - will hold GDMT initially in the setting of permissive hypertension. Will add back as clinically indicated.     CAREY (acute kidney injury)  -sCr 2.16  -Consider fluids but will defer due to heart failure   -Avoid nephrotoxins and renally dose meds as appropriate  -Monitor UOP  -Daily CMP to monitor kidney function         STROKE DOCUMENTATION          NIH Scale:  Interval: baseline  1a. Level of Consciousness: 0-->Alert, keenly responsive  1b. LOC Questions: 0-->Answers both questions correctly  1c. LOC Commands: 0-->Performs both tasks correctly  2. Best Gaze: 0-->Normal  3. Visual: 0-->No visual loss  4. Facial Palsy: 2-->Partial paralysis (total or near-total paralysis of lower face)  5a. Motor Arm, Left: 2-->Some effort against gravity, limb cannot get to or maintain (if cued) 90 (or 45) degrees, drifts down to bed, but has some effort against gravity  5b. Motor Arm, Right: 0-->No drift, limb holds 90 (or 45) degrees for full 10 secs  6a. Motor Leg, Left: 1-->Drift, leg falls by the end of the 5-sec period but does not hit bed  6b. Motor Leg, Right: 0-->No drift, leg holds 30 degree position for full 5 secs  7. Limb Ataxia: 0-->Absent  8. Sensory: 0-->Normal, no sensory loss  9. Best Language: 0-->No aphasia, normal  10. Dysarthria: 1-->Mild-to-moderate dysarthria, patient slurs at least some words and, at worst, can be understood with some difficulty  11. Extinction and Inattention (formerly Neglect): 0-->No abnormality  Total (NIH Stroke Scale): 6     Modified [place] Score: 0  [place] Coma Scale:    ABCD2 Score:    [id number] Score:   HAS -BLED Score:   ICH Score:   Hunt & Hackett Classification:      Thrombolysis Candidate? No, Out of window - Symptom onset > 4.5 hours    Delays to Thrombolysis?  Not Applicable    Interventional Revascularization Candidate?   Is the patient  "eligible for mechanical endovascular reperfusion (CHRISTEL)?  Waiting for MRI results to determine if a good candidate for thrombectomy     Delays to Thrombectomy? Patient requires MRI prior to thrombectomy. See Above for details.      Hemorrhagic change of an Ischemic Stroke: Does this patient have an ischemic stroke with hemorrhagic changes? No         Subjective:     History of Present Illness:  Yobani Walls is a 50 y.o. male with PMH of HTN, HLD,  multiple MI, CHF, CKD, prior stroke (with no residual deficits) that presents as a transfer from [facility] with R intracranial ICA and R M2 occlusion. LKW 9:00pm 07/05/2026. Patient presented to [facility] this morning with left- sided weakness and bizarre behavior. Wife states that patient "seemed drunk" last night, but this morning had difficulty with driving. Patient was evaluated via telestroke. NIH at that time was 3 for left sided drift and L neglect. CTH/CTA revealed occlusion of the distal intracranial ICA with M1 and QUETA being patent and subsequent reocclusion of the R  inferior M2 segment.  as well as established infarcts in the caudate head and foci in the temporal lobe. Patient was OOW for TNK, IR not recommended due to low NIHSS. Decision made to transfer patient to [facility] for MRI and close monitoring for decompensation. On arrival to floor at [facility], stroke team notified. On exam, patient has significant LUE drift, L facial droop, and dysarthria. NIHSS now 6. Patient stood up to go to the bathroom, nurse noted that at this time, patient nearly slumped to the ground due to severe LSW. STAT MRI, ASA load, and HOB Flat ordered. Patient has a defibrillator, device required interrogation prior to MRI.               No past medical history on file.  No past surgical history on file.  Social History[1]  Review of patient's allergies indicates:  No Known Allergies    Medications: I have reviewed the current medication administration record.    Prescriptions Prior to " Admission[2]    Review of Systems   Eyes:  Negative for visual disturbance.   Neurological:  Positive for facial asymmetry, speech difficulty and weakness. Negative for numbness.     Objective:     Vital Signs (Most Recent):  Temp: 98.2 °F (36.8 °C) (07/06/25 1548)  Pulse: (!) 52 (07/06/25 1629)  Resp: 17 (07/06/25 1629)  BP: (!) 163/97 (07/06/25 1548)  SpO2: 99 % (07/06/25 1629)    Vital Signs Range (Last 24H):  Temp:  [98.2 °F (36.8 °C)]   Pulse:  [49-56]   Resp:  [15-20]   BP: (147-179)/()   SpO2:  [99 %-100 %]        Physical Exam  HENT:      Head: Normocephalic and atraumatic.      Mouth/Throat:      Mouth: Mucous membranes are moist.   Eyes:      Extraocular Movements: Extraocular movements intact.      Conjunctiva/sclera: Conjunctivae normal.   Cardiovascular:      Rate and Rhythm: Normal rate.   Pulmonary:      Effort: Pulmonary effort is normal.   Skin:     General: Skin is warm and dry.   Neurological:      Mental Status: He is alert and oriented to person, place, and time.      Motor: Weakness present.              Neurological Exam:   LOC: alert  Attention Span: Good   Language: No aphasia  Articulation: Dysarthria  Orientation: Person, Place, Time   Visual Fields: Full  EOM (CN III, IV, VI): Full/intact  Facial Movement (CN VII): Lower facial weakness on the Left  Motor: Arm left  Paresis: 2/5  Leg left  Paresis: 4/5  Arm right  Normal 5/5  Leg right Normal 5/5  Sensation: intact to light touch       Laboratory:  CMP:   Recent Labs   Lab 07/06/25  1251   CALCIUM 9.5   ALBUMIN 4.3   PROT 7.0      K 4.1   CO2 28      BUN 15   CREATININE 2.16*   ALKPHOS 47   ALT 11   AST 22   BILITOT 1.0     CBC:   Recent Labs   Lab 07/06/25  1251   WBC 5.40   RBC 5.58   HGB 16.1   HCT 47.8      MCV 86   MCH 28.8   MCHC 33.6     Lipid Panel:   Recent Labs   Lab 07/06/25  1630   CHOL 222*   LDLCALC 136.0   HDL 50   TRIG 180*     Coagulation:   Recent Labs   Lab 07/06/25  1251   INR 1.0   APTT 26.1      Hgb A1C:   Recent Labs   Lab 07/06/25  1632   HGBA1C 5.3     TSH:   Recent Labs   Lab 07/06/25  1630   TSH 1.861       Diagnostic Results:      Brain imaging/Vessel Imaging  MRI Brain without contrast pending     CTA Head and Neck OSH 07/06/2025  1. Occluded distal right ICA.  2. Severe stenosis right and left M2 segments.  3. Subjectively decreased enhancement the right sylvian branches.     Tapered narrowing of the mid/distal ICA resulting in complete occlusion.     CT Head OSH 07/06/2025  Acute/subacute infarct right head of the caudate nucleus.       Cardiac Evaluation:   Echo pending         Yohana Hermosillo PA-C  Comprehensive Stroke Center  Department of Vascular Neurology   [facility] Neurosurgery ([facility])              [1]   Social History  Tobacco Use    Smoking status: Unknown   [2]   Medications Prior to Admission   Medication Sig Dispense Refill Last Dose/Taking    HYDROcodone-acetaminophen (NORCO) 5-325 mg per tablet Take 1 tablet by mouth every 4 (four) hours as needed for Pain. 18 tablet 0

## 2025-07-07 NOTE — PLAN OF CARE
Problem: Adult Inpatient Plan of Care  Goal: Plan of Care Review  7/7/2025 0543 by Emmie Velázquez RN  Outcome: Progressing  7/7/2025 0517 by Emmie Velázquez RN  Outcome: Progressing  Goal: Patient-Specific Goal (Individualized)  7/7/2025 0543 by Emmie Velázquez RN  Outcome: Progressing  7/7/2025 0517 by Emmie Velázquez RN  Outcome: Progressing  Goal: Absence of Hospital-Acquired Illness or Injury  Outcome: Progressing  Goal: Readiness for Transition of Care  7/7/2025 0543 by Emmie Velázquez RN  Outcome: Progressing  7/7/2025 0517 by Emmie Velázquez RN  Outcome: Progressing     Problem: Stroke, Ischemic (Includes Transient Ischemic Attack)  Goal: Optimal Coping  Outcome: Progressing  Goal: Effective Bowel Elimination  Outcome: Progressing  Goal: Optimal Cerebral Tissue Perfusion  7/7/2025 0543 by Emmie Velázquez RN  Outcome: Progressing  7/7/2025 0517 by Emmie Velázquez RN  Outcome: Progressing  Goal: Optimal Cognitive Function  Outcome: Progressing  Goal: Optimal Functional Ability  7/7/2025 0543 by Emmie Velázquez RN  Outcome: Progressing  7/7/2025 0517 by Emmie Velázquez RN  Outcome: Progressing  Goal: Improved Sensorimotor Function  Outcome: Progressing     Problem: Fall Injury Risk  Goal: Absence of Fall and Fall-Related Injury  Outcome: Progressing   Patient alert and oriented x 4. Gait unsteady with left side weakness and facial dropping. Stroke booklet education provided to patient and family. All the safety measures implemented. Family member remained at the bedside. Fall precaution continued to be reinforced. POC ongoing.

## 2025-07-07 NOTE — PLAN OF CARE
[facility] - Neurosurgery (Hospital)  Initial Discharge Assessment       Primary Care Provider: Laurie Alston MD    Admission Diagnosis: Stroke [I63.9]  Embolic stroke involving right carotid artery [I63.131]    Admission Date: 7/6/2025  Expected Discharge Date: 7/9/2025    Transition of Care Barriers: None    Payor: [employer] [facility] / Plan: [employer] / Product Type: Medicare Advantage /     Extended Emergency Contact Information  Primary Emergency Contact: TitiCatalina  Mobile Phone: [phone]  Relation: Spouse  Preferred language: English   needed? No    Discharge Plan A: Home with family  Discharge Plan B: Home    No Pharmacies Listed    Initial Assessment (most recent)       Adult Discharge Assessment - 07/07/25 1535          Discharge Assessment    Assessment Type Discharge Planning Assessment     Confirmed/corrected address, phone number and insurance Yes     Confirmed Demographics Correct on Facesheet     Source of Information family     Communicated FLO with patient/caregiver Yes     People in Home spouse     Name(s) of People in Home Catalina Walls (spouse) [phone]     Facility Arrived From: [facility]     Do you expect to return to your current living situation? Yes     Do you have help at home or someone to help you manage your care at home? Yes     Prior to hospitilization cognitive status: Alert/Oriented     Current cognitive status: Unable to Assess     Walking or Climbing Stairs Difficulty no     Dressing/Bathing Difficulty no     Equipment Currently Used at Home none     Readmission within 30 days? No     Patient currently being followed by outpatient case management? No     Do you currently have service(s) that help you manage your care at home? No     Do you take prescription medications? Yes     Do you have prescription coverage? Yes     Do you have any problems affording any of your prescribed medications? No     Is the patient  taking medications as prescribed? yes     Who is going to help you get home at discharge? Patient's spouse will provide transportation home.     How do you get to doctors appointments? car, drives self;family or friend will provide     Are you on dialysis? No     Do you take coumadin? No     Discharge Plan A Home with family     Discharge Plan B Home     DME Needed Upon Discharge  other (see comments)   TBD    Discharge Plan discussed with: Spouse/sig other     Transition of Care Barriers None                      Discharge Plan A and Plan B have been determined by review of patient's clinical status, future medical and therapeutic needs, and coverage/benefits for post-acute care in coordination with multidisciplinary team members.     Mehnaz Gutierrez RN  Ext [phone]

## 2025-07-07 NOTE — ASSESSMENT & PLAN NOTE
"Yobani Walls is a 50 y.o. male with PMH of HTN, HLD,  multiple MI, CHF, CKD, prior stroke (with no residual deficits) that presents as a transfer from [facility] with R intracranial ICA and R M2 occlusion. LKW 9:00pm 07/05/2026. Patient presented to [facility] this morning with left- sided weakness and bizarre behavior. Wife states that patient "seemed drunk" last night, but this morning had difficulty with driving. Patient was evaluated via telestroke. NIH at that time was 3 for left sided drift and L neglect. CTH/CTA revealed occlusion of the distal intracranial ICA with M1 and QUETA being patent and subsequent reocclusion of the R  inferior M2 segment.  as well as established infarcts in the caudate head and foci in the temporal lobe. Patient was OOW for TNK, IR not recommended due to low NIHSS. Decision made to transfer patient to [facility] for MRI and close monitoring for decompensation. On arrival to floor at [facility], stroke team notified. On exam, patient has significant LUE drift, L facial droop, and dysarthria. NIHSS now 6. Patient stood up to go to the bathroom, nurse noted that at this time, patient nearly slumped to the ground due to severe LSW. STAT MRI, ASA load, and HOB Flat ordered. Patient has a defibrillator, device required interrogation prior to MRI.     Discussed worsening exam with VN attending. Will obtain MRI prior to making decision on thrombectomy. Delay in obtaining MRI due to need for device interrogation. Interrogation complete and device switched to MRI mode by cardiology fellow, however, MRI staff requested device information. Device information included in a care update by cardiology fellow, however, MRI staff then stated protocol requires device to be programmed in MRI, not on the floor. This caused further delay in getting patient to MRI. Cardiology fellow and radiology on call notified to expedite MRI process.     Etiology unclear at this time given fluctuating presentation with imaging " showing areas of dilatation - will compare with imaging from prior TIA at OSH.     7/7/2025 Stable neurological exam - NIHSS 6 with left facial palsy, LUE/LLE weakness, and mild dysarthria. Plan for diagnostic angiography to evaluate R MCA fusiform pattern seen on vessel imaging.    Antithrombotics for secondary stroke prevention: Antiplatelets: Aspirin: 81 mg daily  Clopidogrel: 75 mg daily    Statins for secondary stroke prevention and hyperlipidemia, if present:   Statins: Atorvastatin- 40 mg daily    Aggressive risk factor modification: HTN, HLD, Diet, Exercise     Rehab efforts: The patient has been evaluated by a stroke team provider and the therapy needs have been fully considered based off the presenting complaints and exam findings. The following therapy evaluations are needed: PT evaluate and treat, OT evaluate and treat, SLP evaluate and treat, PM&R evaluate for appropriate placement    Diagnostics ordered/pending: TTE to assess cardiac function/status , MRI complete    VTE prophylaxis: Heparin 5000 units SQ every 8 hours  Mechanical prophylaxis: Place SCDs    BP parameters: Infarct: No intervention, SBP <220

## 2025-07-07 NOTE — PROGRESS NOTES
"[facility] - Neurosurgery ([facility])  Vascular Neurology  Comprehensive Stroke Center  Progress Note    Assessment/Plan:     * Embolic stroke involving right carotid artery  Yobani Walls is a 50 y.o. male with PMH of HTN, HLD,  multiple MI, CHF, CKD, prior stroke (with no residual deficits) that presents as a transfer from [facility] with R intracranial ICA and R M2 occlusion. LKW 9:00pm 07/05/2026. Patient presented to [facility] this morning with left- sided weakness and bizarre behavior. Wife states that patient "seemed drunk" last night, but this morning had difficulty with driving. Patient was evaluated via telestroke. NIH at that time was 3 for left sided drift and L neglect. CTH/CTA revealed occlusion of the distal intracranial ICA with M1 and QUETA being patent and subsequent reocclusion of the R  inferior M2 segment.  as well as established infarcts in the caudate head and foci in the temporal lobe. Patient was OOW for TNK, IR not recommended due to low NIHSS. Decision made to transfer patient to [facility] for MRI and close monitoring for decompensation. On arrival to floor at [facility], stroke team notified. On exam, patient has significant LUE drift, L facial droop, and dysarthria. NIHSS now 6. Patient stood up to go to the bathroom, nurse noted that at this time, patient nearly slumped to the ground due to severe LSW. STAT MRI, ASA load, and HOB Flat ordered. Patient has a defibrillator, device required interrogation prior to MRI.     Discussed worsening exam with VN attending. Will obtain MRI prior to making decision on thrombectomy. Delay in obtaining MRI due to need for device interrogation. Interrogation complete and device switched to MRI mode by cardiology fellow, however, MRI staff requested device information. Device information included in a care update by cardiology fellow, however, MRI staff then stated protocol requires device to be programmed in MRI, not on the floor. This caused further delay in " getting patient to MRI. Cardiology fellow and radiology on call notified to expedite MRI process.     Etiology unclear at this time given fluctuating presentation with imaging showing areas of dilatation - will compare with imaging from prior TIA at OSH.     7/7/2025 Stable neurological exam - NIHSS 6 with left facial palsy, LUE/LLE weakness, and mild dysarthria. Plan for diagnostic angiography to evaluate R MCA fusiform pattern seen on vessel imaging.    Antithrombotics for secondary stroke prevention: Antiplatelets: Aspirin: 81 mg daily  Clopidogrel: 75 mg daily    Statins for secondary stroke prevention and hyperlipidemia, if present:   Statins: Atorvastatin- 40 mg daily    Aggressive risk factor modification: HTN, HLD, Diet, Exercise     Rehab efforts: The patient has been evaluated by a stroke team provider and the therapy needs have been fully considered based off the presenting complaints and exam findings. The following therapy evaluations are needed: PT evaluate and treat, OT evaluate and treat, SLP evaluate and treat, PM&R evaluate for appropriate placement    Diagnostics ordered/pending: TTE to assess cardiac function/status , MRI complete    VTE prophylaxis: Heparin 5000 units SQ every 8 hours  Mechanical prophylaxis: Place SCDs    BP parameters: Infarct: No intervention, SBP <220        CKD (chronic kidney disease)  History of chronic kidney disease noted.     Plan:   - avoid nephrotoxic medications   - renally dose medications   - monitor with daily labs     Hypertension  Patient is on the following at home per chart review from care everywhere: amlodipine 10 mg qd, carvedilol 25 mg qd, lasix 40 mg qd, hydralazine 50 mg unspecified number of times per day.     Plan:   - hold hypertension medications in setting of permissive hypertension, will add back as clinically indicated.     Hyperlipidemia  Patient is on the following at home per care everywhere chart review: rosuvastatin 40 mg qd, fenofibrate 145  mg unspecified number of times per day.     Plan:   - will substitute rosuvastatin for atorvastatin   - continue fenofibrate         CHF (congestive heart failure)  Patient has extensive history of congestive heart failure. Per chart review from care everywhere, he is on the following regimen at home: carvedilol 25 mg qd, entresto  mg, farxiga 10 mg qd, lasix 40 mg qd.     Plan:   - will hold GDMT initially in the setting of permissive hypertension. Will add back as clinically indicated.          7/7/2025 Stable neurological exam - NIHSS 6 with left facial palsy, LUE/LLE weakness, and mild dysarthria. Plan for diagnostic angiography to evaluate R MCA fusiform pattern seen on vessel imaging.    STROKE DOCUMENTATION        NIH Scale:  1a. Level of Consciousness: 0-->Alert, keenly responsive  1b. LOC Questions: 0-->Answers both questions correctly  1c. LOC Commands: 0-->Performs both tasks correctly  2. Best Gaze: 0-->Normal  3. Visual: 0-->No visual loss  4. Facial Palsy: 2-->Partial paralysis (total or near-total paralysis of lower face)  5a. Motor Arm, Left: 2-->Some effort against gravity, limb cannot get to or maintain (if cued) 90 (or 45) degrees, drifts down to bed, but has some effort against gravity  5b. Motor Arm, Right: 0-->No drift, limb holds 90 (or 45) degrees for full 10 secs  6a. Motor Leg, Left: 2-->Some effort against gravity, leg falls to bed by 5 secs, but has some effort against gravity  6b. Motor Leg, Right: 0-->No drift, leg holds 30 degree position for full 5 secs  7. Limb Ataxia: 0-->Absent  8. Sensory: 0-->Normal, no sensory loss  9. Best Language: 0-->No aphasia, normal  10. Dysarthria: 1-->Mild-to-moderate dysarthria, patient slurs at least some words and, at worst, can be understood with some difficulty  11. Extinction and Inattention (formerly Neglect): 0-->No abnormality  Total (NIH Stroke Scale): 7       Modified Mecklenburg Score: 0  [place] Coma Scale:    ABCD2 Score:    [id number]  Score:   HAS -BLED Score:   ICH Score:   Hunt & Hackett Classification:      Hemorrhagic change of an Ischemic Stroke: Does this patient have an ischemic stroke with hemorrhagic changes? No     Neurologic Chief Complaint: R carotid ICA embolism of unclear etiology    Subjective:     Interval History: Patient is seen for follow-up neurological assessment and treatment recommendations:     - Partner at bedside notes that pt was not taking ASA for the past two weeks.    HPI, Past Medical, Family, and Social History remains the same as documented in the initial encounter.     Review of Systems   Neurological:  Positive for facial asymmetry, speech difficulty and weakness. Negative for headaches.     Scheduled Meds:   aspirin  81 mg Oral Daily    atorvastatin  40 mg Oral Daily    clopidogreL  75 mg Oral Daily    pantoprazole  40 mg Oral Daily    potassium bicarbonate  40 mEq Oral Q2H     Continuous Infusions:   0.9% NaCl   Intravenous Continuous 50 mL/hr at 07/06/25 2042 New Bag at 07/06/25 2042     PRN Meds:  Current Facility-Administered Medications:     baclofen, 5 mg, Oral, TID PRN    bisacodyL, 10 mg, Rectal, Daily PRN    hydrALAZINE, 10 mg, Intravenous, Q6H PRN    ondansetron, 4 mg, Intravenous, Q12H PRN    sodium chloride 0.9%, 500 mL, Intravenous, PRN    sodium chloride 0.9%, 10 mL, Intravenous, PRN    Objective:     Vital Signs (Most Recent):  Temp: 98.1 °F (36.7 °C) (07/07/25 0733)  Pulse: 64 (07/07/25 1036)  Resp: 18 (07/06/25 1954)  BP: (!) 188/106 (07/07/25 0733)  SpO2: 95 % (07/07/25 0733)       Vital Signs Range (Last 24H):  Temp:  [97.9 °F (36.6 °C)-98.6 °F (37 °C)]   Pulse:  [47-64]   Resp:  [15-20]   BP: (147-188)/()   SpO2:  [95 %-100 %]           Physical Exam  Vitals and nursing note reviewed.   HENT:      Head: Normocephalic.   Eyes:      Extraocular Movements: Extraocular movements intact.      Pupils: Pupils are equal, round, and reactive to light.   Pulmonary:      Effort: Pulmonary effort is  normal. No respiratory distress.   Skin:     General: Skin is warm and dry.   Neurological:      Mental Status: He is alert.      Motor: Weakness present.          Neurological Exam:   LOC: alert  Attention Span: Good   Language: No aphasia  Articulation: Dysarthria  Orientation: Person, Place, Time   Visual Fields: Full  EOM (CN III, IV, VI): Full/intact  Pupils (CN II, III): PERRL  Facial Movement (CN VII): Lower facial weakness on the Left  Cerebellum: No evidence of appendicular or axial ataxia  Sensation: intact to light touch    Laboratory:  CMP:   Recent Labs   Lab 07/07/25  0532   CALCIUM 9.1   ALBUMIN 3.8   PROT 6.4      K 3.4*   CO2 25   *   BUN 14   CREATININE 1.8*   ALKPHOS 41   ALT 8*   AST 15   BILITOT 1.0     BMP:   Recent Labs   Lab 07/07/25  0532      K 3.4*   *   CO2 25   BUN 14   CREATININE 1.8*   CALCIUM 9.1     CBC:   Recent Labs   Lab 07/07/25  0532   WBC 6.26   RBC 5.62   HGB 15.9   HCT 47.9      MCV 85   MCH 28.3   MCHC 33.2     Lipid Panel:   Recent Labs   Lab 07/06/25  1630   CHOL 222*   LDLCALC 136.0   HDL 50   TRIG 180*     Coagulation:   Recent Labs   Lab 07/07/25  0532   INR 1.0   APTT 27.3     Hgb A1C:   Recent Labs   Lab 07/06/25  1632   HGBA1C 5.3     TSH:   Recent Labs   Lab 07/06/25  1630   TSH 1.861       Diagnostic Results     Brain Imaging   MRI Brain w/o  - Acute infarction of the right cerebral hemisphere involving the right caudate head, right amygdala, right subinsular cortex, and portions of the right frontal cortex.  - No evidence of hemorrhagic conversion, mass effect, or midline shift.    CT Head 07/06/2025  Acute/subacute infarct right head of the caudate nucleus.     Vessel Imaging   CTA Brain 07/06/2025  1. Occluded distal right ICA.  2. Severe stenosis right and left M2 segments.  3. Subjectively decreased enhancement the right sylvian branches.     CTA Neck 07/06/2025  Tapered narrowing of the mid/distal ICA resulting in complete  occlusion.    Cardiac Imaging   Echo pending    Godwin Campos MD  Comprehensive Stroke Center  Department of Vascular Neurology   [facility] - Neurosurgery ([facility])

## 2025-07-07 NOTE — PLAN OF CARE
Call placed to patient's spouse Catalina ([phone]) to complete the discharge planning assessment. Therapy is currently recommending Rehab services when stable for discharge. Catalina stated that she would prefer to take her  home with outpatient therapy instead.    Authorization for release of confidential information form faxed to [facility] at ([phone].    Mehnaz Gutierrez RN  Ext [phone]

## 2025-07-07 NOTE — PLAN OF CARE
Problem: Adult Inpatient Plan of Care  Goal: Plan of Care Review  Outcome: Progressing  Goal: Patient-Specific Goal (Individualized)  Outcome: Progressing  Goal: Absence of Hospital-Acquired Illness or Injury  Outcome: Progressing  Goal: Readiness for Transition of Care  Outcome: Progressing     Problem: Stroke, Ischemic (Includes Transient Ischemic Attack)  Goal: Effective Bowel Elimination  Outcome: Progressing  Goal: Optimal Cerebral Tissue Perfusion  Outcome: Progressing  Goal: Optimal Cognitive Function  Outcome: Progressing  Goal: Optimal Functional Ability  Outcome: Progressing

## 2025-07-07 NOTE — PT/OT/SLP EVAL
"Occupational Therapy   Evaluation    Name: Yobani Walls  MRN: [id number]  Admitting Diagnosis: Embolic stroke involving right carotid artery  Recent Surgery: * No surgery found *      Recommendations:     Discharge Recommendations: Low Intensity Therapy (pending OOB assessment when HOB flat restrictions lifted)  Discharge Equipment Recommendations:  none  Barriers to discharge:  None    Assessment:     Yobani Walls is a 50 y.o. male with a medical diagnosis of Embolic stroke involving right carotid artery.  He presents with performance deficits affecting function: impaired endurance, impaired self care skills, impaired functional mobility.      Rehab Prognosis: Good; patient would benefit from acute skilled OT services to address these deficits and reach maximum level of function.       Plan:     Patient to be seen 3 x/week to address the above listed problems via neuromuscular re-education, therapeutic exercises, therapeutic activities, self-care/home management  Plan of Care Expires: 08/04/25  Plan of Care Reviewed with: patient    Subjective   Patient:  "I started not feeling good and almost fell because my left side got weak."    Occupational Profile:  Per patient:  Patient resides in [place] with his wife in one story home with no steps to enter.  Patient is right handed.  PTA patient independent with ADLs including driving.  Works: [employer].  Hobbies: fishing.  Currently owns no DME.      Pain/Comfort:  Pain Rating 1: 0/10  Pain Rating Post-Intervention 1: 0/10    Patients cultural, spiritual, Presybeterian conflicts given the current situation: no    Objective:     Communicated with: Nurse prior to session.  Patient found supine with bed alarm upon OT entry to room.    General Precautions: Standard, aspiration, fall (HOB flat)--at first session; after stroke rounds, patient cleared for getting up with therapy  Orthopedic Precautions: N/A  Braces: N/A  Respiratory Status: Room air    Occupational Performance:    Bed " Mobility:    Patient completed Rolling/Turning to Left with  modified independence  Patient completed Rolling/Turning to Right with modified independence  Supine-sit:  NT 2* HOB flat restrictions (first session)  2nd session:  Modified independent with rolling and supine-sit    Functional Mobility/Transfers:  NT 2* HOB flat restrictions (first session)  Supervision with stand pivot transfers    Activities of Daily Living:  Grooming: Supervision  Upper body dressing:  supervision  Lower body dressing:  supervision    Cognitive/Visual Perceptual:  Cognitive/Psychosocial Skills:     -       Oriented to: Person, Place, Time, and Situation   -       Follows Commands/attention:Follows one-step commands  -       Communication: clear/fluent    Physical Exam:  Postural examination/scapula alignment:    -       Rounded shoulders  Upper Extremity Range of Motion:     -       Right Upper Extremity: WNL  -       Left Upper Extremity: WNL  Upper Extremity Strength:    -       Right Upper Extremity: WNL  -       Left Upper Extremity: 4/5 proximally    AMPAC 6 Click ADL:  AMPAC Total Score: 18    Treatment & Education:  Patient alert and oriented x 3; able to follow 4/4 one step commands.  Patient attentive and interactive throughout the session.  Patient able to identify 5/5 body parts.  Able to name 5/5 objects.  Able to sequence 7/7 days of the week and 12/12 months of the year.  Following stroke rounds, Dr. Almanzar cleared patient to get up with therapy and to stop if there is any decline.  During 2nd session, patient at modified independent level with bed mobility and supervision with transfers and ADLs      Patient left supine with all lines intact, call button in reach, and bed alarm on    GOALS:   Multidisciplinary Problems       Occupational Therapy Goals          Problem: Occupational Therapy    Goal Priority Disciplines Outcome Interventions   Occupational Therapy Goal     OT, PT/OT Progressing    Description: Goals set  7/7 with an expiration date, 8/4:  Patient will increase functional independence with ADLs by performing:  Patient will demonstrate supine -sit with modified independence.   Not met  Patient will demonstrate stand pivot transfers with modified independence.   Not met  Patient will demonstrate grooming while standing with modified independence.   Not met  Patient will demonstrate upper body dressing with modified independence while seated EOB.   Not met  Patient will demonstrate lower body dressing with modified independence while seated EOB.   Not met  Patient will demonstrate toileting with modified independence.   Not met  Patient will demonstrate bathing while seated EOB with modified independence.   Not met  Patient's family / caregiver will demonstrate independence and safety with assisting patient with self-care skills and functional mobility.     Not met  Patient and/or patient's family will verbalize understanding of stroke prevention guidelines, personal risk factors and stroke warning signs via teachback method.  Not met                                DME Justifications:  No DME recommended requiring DME justifications    History:     No past medical history on file.    No past surgical history on file.    Time Tracking:     OT Date of Treatment: 07/07/25  OT Start Time: 0626  OT Stop Time: 0636    2nd session when HOB restrictions lifted:  9:39-9:50 (11)   OT Total Time (min): 21 min    Billable Minutes:Evaluation 10  Therapeutic Activity;  11    7/7/2025

## 2025-07-07 NOTE — ASSESSMENT & PLAN NOTE
-Cr 2.2 > 1.8 (baseline)  -Consider fluids but will defer due to heart failure   -Avoid nephrotoxins and renally dose meds as appropriate  -Monitor UOP  -Daily CMP to monitor kidney function

## 2025-07-07 NOTE — CARE UPDATE
Care Update    - S/p MRI   - Device interrogated again.   - Device set back to VINI     Uday BROWN Obi, MD - PGY- IV  Department of Cardiovascular disease   [facility]

## 2025-07-07 NOTE — PT/OT/SLP EVAL
"Speech Language Pathology Evaluation  Cognitive/Bedside Swallow    Patient Name:  Yobani Walls   MRN:  [id number]  Admitting Diagnosis: Embolic stroke involving right carotid artery    Recommendations:                  General Recommendations:  Cognitive-linguistic therapy  Diet recommendations:  Regular, Thin   Aspiration Precautions: HOB to 90 degrees, Meds whole 1 at a time, and Standard aspiration precautions   General Precautions: Standard, aspiration, fall  Communication strategies:  none  Discharge recommendations:  Low Intensity Therapy   Barriers to Discharge:  None    Assessment:     Yobani Walls is a 50 y.o. male with oral and pharyngeal phases of swallow wfl  History:     No past medical history on file.    No past surgical history on file.    Social History: Patient lives with wife.      Prior diet: regular with thin.    Occupation/hobbies/homemaking: [employer].    Subjective     "He is doing better" per wife  Patient goals: home     Pain/Comfort:  Pain Rating 1: 0/10  Pain Rating Post-Intervention 1: 0/10    Respiratory Status: Room air    Objective:     Cognitive Status:    Pt. was oriented x3 with good recall of recent and remote temporal and general information.  Immediate/delayed verbal recall was wfl with pt. Repeating 7 digits and 5 words without difficulty.    Responses to hypothetical verbal problem solving tasks were accurate and complete.  Pt. Compared and contrasted objects and generated multiple solutions to problems.  Thought organization and categorization skills were wfl with pt. Naming 18 animals given one minute when 15-20 are wnl.  Pt. Responded to functional math and time calculations with 100% accuracy   Receptive Language:   Comprehension:   Pt. Responded to complex yes/no questions and multi step commands with 100% accuracy and no delays in responding.    .    Pragmatics:    Poor eye contact (pt. Distracted by cell phone)    Expressive Language:  Verbal:    Verbal language skills were wfl " with no evidence of aphasia.  Pt. Expressed their thoughts coherently in conversation with no evidence of confusion or word finding deficits          Motor Speech:  wfl    Voice:   wfl    Visual-Spatial:  tba    Reading:   tba     Written Expression:   tba    Oral Musculature Evaluation  Oral Musculature: WFL, left weakness  Dentition: present and adequate  Secretion Management: adequate  Mucosal Quality: good  Mandibular Strength and Mobility: WFL  Oral Labial Strength and Mobility: WFL  Lingual Strength and Mobility: WFL  Velar Elevation: WFL  Buccal Strength and Mobility: WFL  Volitional Cough: strong  Volitional Swallow: no delay  Voice Prior to PO Intake: wfl    Bedside Swallow Eval:   Consistencies Assessed:  Thin liquids 2 teaspoons then self fed 6 sips of water via bottle  Puree 1 teaspoon pudding  Solids self fed one cracker     Oral Phase:   WFL    Pharyngeal Phase:   no overt clinical signs/symptoms of aspiration  no overt clinical signs/symptoms of pharyngeal dysphagia    Compensatory Strategies  None    Treatment: Education provided re role of slp and plan of care.     Goals:   Multidisciplinary Problems       SLP Goals          Problem: SLP    Goal Priority Disciplines Outcome   SLP Goal     SLP Progressing   Description: Goals due 7/17  1.  Tolerate regular diet with thin liquids with no s/s of aspiration  2.  Assess functional reading ,writing, visual spatial skills  3.  Participate in ongoing assessment of higher cognitive skills                       Plan:     Patient to be seen:  4 x/week   Plan of Care expires:  08/04/25  Plan of Care reviewed with:  patient, spouse   SLP Follow-Up:  Yes       Time Tracking:     SLP Treatment Date:   07/07/25  Speech Start Time:  0810  Speech Stop Time:  0830     Speech Total Time (min):  20 min    Billable Minutes: Eval 12  and Eval Swallow and Oral Function 8    07/07/2025

## 2025-07-07 NOTE — PLAN OF CARE
Goals remain appropriate.  Problem: Occupational Therapy  Goal: Occupational Therapy Goal  Description: Goals set 7/7 with an expiration date, 8/4:  Patient will increase functional independence with ADLs by performing:  Patient will demonstrate supine -sit with modified independence.   Not met  Patient will demonstrate stand pivot transfers with modified independence.   Not met  Patient will demonstrate grooming while standing with modified independence.   Not met  Patient will demonstrate upper body dressing with modified independence while seated EOB.   Not met  Patient will demonstrate lower body dressing with modified independence while seated EOB.   Not met  Patient will demonstrate toileting with modified independence.   Not met  Patient will demonstrate bathing while seated EOB with modified independence.   Not met  Patient's family / caregiver will demonstrate independence and safety with assisting patient with self-care skills and functional mobility.     Not met  Patient and/or patient's family will verbalize understanding of stroke prevention guidelines, personal risk factors and stroke warning signs via teachback method.  Not met           Outcome: Progressing

## 2025-07-07 NOTE — PLAN OF CARE
Regular diet with thin liquids recommended.    Problem: SLP  Goal: SLP Goal  Description: Goals due 7/17  1.  Tolerate regular diet with thin liquids with no s/s of aspiration  2.  Assess functional reading ,writing, visual spatial skills  3.  Participate in ongoing assessment of higher cognitive skills  Outcome: Progressing

## 2025-07-07 NOTE — PLAN OF CARE
Problem: Physical Therapy  Goal: Physical Therapy Goal  Description: Goals to be met by: 25     Patient will increase functional independence with mobility by performin. Sit to stand transfer with [place]  2. Bed to chair transfer with [place] using No Assistive Device  3. Gait  x 75 feet with [place] using No Assistive Device.   4. Stand for 5 minutes with Supervision using No Assistive Device    Outcome: Progressing  PT eval completed and goals established. Pt will begin PT POC, progressing as tolerated.

## 2025-07-07 NOTE — PT/OT/SLP EVAL
Physical Therapy Evaluation    Patient Name:  Yobani Walls   MRN:  [id number]    Recommendations:     Discharge Recommendations: High Intensity Therapy   Discharge Equipment Recommendations: none   Barriers to discharge: None    Assessment:     Yobani Walls is a 50 y.o. male admitted with a medical diagnosis of Embolic stroke involving right carotid artery.  He presents with the following impairments/functional limitations: impaired functional mobility, gait instability, impaired balance, decreased coordination, decreased lower extremity function, decreased upper extremity function. Patient oriented x4 following all commands throughout session. BLE WNL with no sensory deficits in LE. Patient required no assistance for bed mobility or sit to stand transfer however displayed L lean during standing balance tasks and required modA for ambulation due to gait deficits. Recommending high intensity post-acute therapy after discharge to maximize benefits; patient could tolerate 3xhours/day of combined therapies.      Rehab Prognosis: Good; patient would benefit from acute skilled PT services to address these deficits and reach maximum level of function.    Recent Surgery: * No surgery found *      Plan:     During this hospitalization, patient to be seen 4 x/week to address the identified rehab impairments via gait training, therapeutic activities, therapeutic exercises, neuromuscular re-education and progress toward the following goals:    Plan of Care Expires:  07/21/25    Subjective     Chief Complaint: reported arm weakness  Patient/Family Comments/goals: to get better  Pain/Comfort:  Pain Rating 1: 0/10  Pain Rating Post-Intervention 1: 0/10    Patients cultural, spiritual, Oriental orthodox conflicts given the current situation: no  Living Environment: Patient lives with wife in [place] with 0STE.   Prior Level of Function: independent  DME used: none  DME owned (not currently used): none  Assistance upon Discharge: not stated      Objective:      Patient found HOB elevated with bed alarm, telemetry, peripheral IV  upon PT entry to room. Visitor in room.     General Precautions: Standard, fall, aspiration  Orthopedic Precautions:N/A   Braces: N/A  Respiratory Status: Room air    Exams:  Cognitive Exam:  Patient is oriented to Person, Place, Time, and Situation  Sensation:    -       Intact  RLE ROM: WNL  RLE Strength: WNL  LLE ROM: WNL  LLE Strength: WNL    Functional Mobility:  Bed Mobility:     Scooting: supervision  Supine to Sit: supervision  To L, HOB slightly elevated   Sit to Supine: supervision    Transfers:     Sit to Stand:  contact guard assistance with no AD  Toilet: CGA no AD    Gait: patient ambulated 15', 15' with modA and no AD  Deviations Noted: narrow PHIL (occasional scissoring due to poor LLE coordination), flat foot contact R, L, and increased lateral sway L     Balance:   Sitting static: independent  Sitting dynamic: SBA  Standing static: CGA-Steven  L lean  Standing dynamic: Steven       AM-PAC 6 CLICK MOBILITY  Total Score:19       Treatment & Education:  Education: patient educated on POC, need for therapy, safety with mobility, discharge recommendations and equipment recommendations. Patient verbalized understanding of all topics.    Verbal and tactile cues with assist during STS transfer for optimal PHIL prior to transfer, forward weight shift to initiate, to engage LE mm, extend hips forward and bring head and chest up to achieve full upright stance.    Cues during ambulation for sequencing, weight shifting, upright posture throughout, appropriate step length and height.      Patient left supine with all lines intact, call button in reach, and bed alarm on.    GOALS:   Multidisciplinary Problems       Physical Therapy Goals          Problem: Physical Therapy    Goal Priority Disciplines Outcome Interventions   Physical Therapy Goal     PT, PT/OT Progressing    Description: Goals to be met by: 07/21/25     Patient will  increase functional independence with mobility by performin. Sit to stand transfer with [place]  2. Bed to chair transfer with [place] using No Assistive Device  3. Gait  x 75 feet with [place] using No Assistive Device.   4. Stand for 5 minutes with Supervision using No Assistive Device                         History:     No past medical history on file.    No past surgical history on file.    Time Tracking:     PT Received On: 25  PT Start Time: 1107     PT Stop Time: 1120  PT Total Time (min): 13 min     Billable Minutes: Evaluation 13 minutes      2025

## 2025-07-07 NOTE — HOSPITAL COURSE
7/7/2025 Stable neurological exam - NIHSS 6 with left facial palsy, LUE/LLE weakness, and mild dysarthria. Plan for diagnostic angiography to evaluate R MCA fusiform pattern seen on vessel imaging.  7/8/2025 Improved left hemiparesis particularly in the LUE. Slightly improved L facial palsy with mild dysarthria. Plan for diagnostic angio today, otherwise medically appropriate for discharge. Pt prefers home with outpatient therapy.  7/9/2025 NAEON. Neuro exam with L lower facial palsy and mild dysarthria. No hemiparesis. Diagnostic angio delayed to today with subsequent discharge home.  7/10/2025 NAEON. Stable neuro exam. Appropriate for discharge home. Diagnostic angio shows chronic progressive stenosis of R ICA with EC-IC collateral anastomoses. Given the angiography findings, he is unlikely to benefit from stent placement and is at risk for reperfusion hemorrhage so will not proceed with intervention at this point. He has low EF and highest suspicion for cardioembolic etiology at this time. He follows with cardiology and on GDMT. Continue DAPT and follow up with vascular neurology in 3-4 weeks.

## 2025-07-07 NOTE — SUBJECTIVE & OBJECTIVE
Neurologic Chief Complaint: R carotid ICA embolism of unclear etiology    Subjective:     Interval History: Patient is seen for follow-up neurological assessment and treatment recommendations:     - Partner at bedside notes that pt was not taking ASA for the past two weeks.    HPI, Past Medical, Family, and Social History remains the same as documented in the initial encounter.     Review of Systems   Neurological:  Positive for facial asymmetry, speech difficulty and weakness. Negative for headaches.     Scheduled Meds:   aspirin  81 mg Oral Daily    atorvastatin  40 mg Oral Daily    clopidogreL  75 mg Oral Daily    pantoprazole  40 mg Oral Daily    potassium bicarbonate  40 mEq Oral Q2H     Continuous Infusions:   0.9% NaCl   Intravenous Continuous 50 mL/hr at 07/06/25 2042 New Bag at 07/06/25 2042     PRN Meds:  Current Facility-Administered Medications:     baclofen, 5 mg, Oral, TID PRN    bisacodyL, 10 mg, Rectal, Daily PRN    hydrALAZINE, 10 mg, Intravenous, Q6H PRN    ondansetron, 4 mg, Intravenous, Q12H PRN    sodium chloride 0.9%, 500 mL, Intravenous, PRN    sodium chloride 0.9%, 10 mL, Intravenous, PRN    Objective:     Vital Signs (Most Recent):  Temp: 98.1 °F (36.7 °C) (07/07/25 0733)  Pulse: 64 (07/07/25 1036)  Resp: 18 (07/06/25 1954)  BP: (!) 188/106 (07/07/25 0733)  SpO2: 95 % (07/07/25 0733)       Vital Signs Range (Last 24H):  Temp:  [97.9 °F (36.6 °C)-98.6 °F (37 °C)]   Pulse:  [47-64]   Resp:  [15-20]   BP: (147-188)/()   SpO2:  [95 %-100 %]           Physical Exam  Vitals and nursing note reviewed.   HENT:      Head: Normocephalic.   Eyes:      Extraocular Movements: Extraocular movements intact.      Pupils: Pupils are equal, round, and reactive to light.   Pulmonary:      Effort: Pulmonary effort is normal. No respiratory distress.   Skin:     General: Skin is warm and dry.   Neurological:      Mental Status: He is alert.      Motor: Weakness present.          Neurological Exam:   LOC:  alert  Attention Span: Good   Language: No aphasia  Articulation: Dysarthria  Orientation: Person, Place, Time   Visual Fields: Full  EOM (CN III, IV, VI): Full/intact  Pupils (CN II, III): PERRL  Facial Movement (CN VII): Lower facial weakness on the Left  Cerebellum: No evidence of appendicular or axial ataxia  Sensation: intact to light touch    Laboratory:  CMP:   Recent Labs   Lab 07/07/25  0532   CALCIUM 9.1   ALBUMIN 3.8   PROT 6.4      K 3.4*   CO2 25   *   BUN 14   CREATININE 1.8*   ALKPHOS 41   ALT 8*   AST 15   BILITOT 1.0     BMP:   Recent Labs   Lab 07/07/25  0532      K 3.4*   *   CO2 25   BUN 14   CREATININE 1.8*   CALCIUM 9.1     CBC:   Recent Labs   Lab 07/07/25  0532   WBC 6.26   RBC 5.62   HGB 15.9   HCT 47.9      MCV 85   MCH 28.3   MCHC 33.2     Lipid Panel:   Recent Labs   Lab 07/06/25  1630   CHOL 222*   LDLCALC 136.0   HDL 50   TRIG 180*     Coagulation:   Recent Labs   Lab 07/07/25  0532   INR 1.0   APTT 27.3     Hgb A1C:   Recent Labs   Lab 07/06/25  1632   HGBA1C 5.3     TSH:   Recent Labs   Lab 07/06/25  1630   TSH 1.861       Diagnostic Results     Brain Imaging   MRI Brain w/o  - Acute infarction of the right cerebral hemisphere involving the right caudate head, right amygdala, right subinsular cortex, and portions of the right frontal cortex.  - No evidence of hemorrhagic conversion, mass effect, or midline shift.    CT Head 07/06/2025  Acute/subacute infarct right head of the caudate nucleus.     Vessel Imaging   CTA Brain 07/06/2025  1. Occluded distal right ICA.  2. Severe stenosis right and left M2 segments.  3. Subjectively decreased enhancement the right sylvian branches.     CTA Neck 07/06/2025  Tapered narrowing of the mid/distal ICA resulting in complete occlusion.    Cardiac Imaging   Echo pending

## 2025-07-07 NOTE — CONSULTS
Inpatient consult to Physical Medicine Rehab  Consult performed by: Roseann Vieyra NP  Consult ordered by: Hayden Ware MD  Reason for consult: Rehab      Consult received.     SHAWN Lee, FNP-C  Physical Medicine & Rehabilitation   07/07/2025

## 2025-07-08 LAB
ABSOLUTE EOSINOPHIL (OHS): 0.17 K/UL
ABSOLUTE MONOCYTE (OHS): 0.68 K/UL (ref 0.3–1)
ABSOLUTE NEUTROPHIL COUNT (OHS): 3.23 K/UL (ref 1.8–7.7)
ALBUMIN SERPL BCP-MCNC: 3.5 G/DL (ref 3.5–5.2)
ALP SERPL-CCNC: 40 UNIT/L (ref 40–150)
ALT SERPL W/O P-5'-P-CCNC: 8 UNIT/L (ref 10–44)
ANION GAP (OHS): 9 MMOL/L (ref 8–16)
AST SERPL-CCNC: 16 UNIT/L (ref 11–45)
BASOPHILS # BLD AUTO: 0.05 K/UL
BASOPHILS NFR BLD AUTO: 0.8 %
BILIRUB SERPL-MCNC: 0.9 MG/DL (ref 0.1–1)
BUN SERPL-MCNC: 17 MG/DL (ref 6–20)
CALCIUM SERPL-MCNC: 9 MG/DL (ref 8.7–10.5)
CHLORIDE SERPL-SCNC: 109 MMOL/L (ref 95–110)
CO2 SERPL-SCNC: 23 MMOL/L (ref 23–29)
CREAT SERPL-MCNC: 1.8 MG/DL (ref 0.5–1.4)
ERYTHROCYTE [DISTWIDTH] IN BLOOD BY AUTOMATED COUNT: 14.5 % (ref 11.5–14.5)
GFR SERPLBLD CREATININE-BSD FMLA CKD-EPI: 46 ML/MIN/1.73/M2
GLUCOSE SERPL-MCNC: 78 MG/DL (ref 70–110)
HCT VFR BLD AUTO: 45.4 % (ref 40–54)
HGB BLD-MCNC: 15.2 GM/DL (ref 14–18)
IMM GRANULOCYTES # BLD AUTO: 0.01 K/UL (ref 0–0.04)
IMM GRANULOCYTES NFR BLD AUTO: 0.2 % (ref 0–0.5)
LYMPHOCYTES # BLD AUTO: 2.25 K/UL (ref 1–4.8)
MCH RBC QN AUTO: 28.5 PG (ref 27–31)
MCHC RBC AUTO-ENTMCNC: 33.5 G/DL (ref 32–36)
MCV RBC AUTO: 85 FL (ref 82–98)
NUCLEATED RBC (/100WBC) (OHS): 0 /100 WBC
PLATELET # BLD AUTO: 194 K/UL (ref 150–450)
PMV BLD AUTO: 11.3 FL (ref 9.2–12.9)
POTASSIUM SERPL-SCNC: 3.6 MMOL/L (ref 3.5–5.1)
PROT SERPL-MCNC: 5.9 GM/DL (ref 6–8.4)
RBC # BLD AUTO: 5.34 M/UL (ref 4.6–6.2)
RELATIVE EOSINOPHIL (OHS): 2.7 %
RELATIVE LYMPHOCYTE (OHS): 35.2 % (ref 18–48)
RELATIVE MONOCYTE (OHS): 10.6 % (ref 4–15)
RELATIVE NEUTROPHIL (OHS): 50.5 % (ref 38–73)
SODIUM SERPL-SCNC: 141 MMOL/L (ref 136–145)
WBC # BLD AUTO: 6.39 K/UL (ref 3.9–12.7)

## 2025-07-08 PROCEDURE — 36415 COLL VENOUS BLD VENIPUNCTURE: CPT

## 2025-07-08 PROCEDURE — 25000003 PHARM REV CODE 250

## 2025-07-08 PROCEDURE — 97116 GAIT TRAINING THERAPY: CPT

## 2025-07-08 PROCEDURE — 99499 UNLISTED E&M SERVICE: CPT | Mod: ,,, | Performed by: PSYCHIATRY & NEUROLOGY

## 2025-07-08 PROCEDURE — 94761 N-INVAS EAR/PLS OXIMETRY MLT: CPT

## 2025-07-08 PROCEDURE — 99233 SBSQ HOSP IP/OBS HIGH 50: CPT | Mod: GC,,, | Performed by: PSYCHIATRY & NEUROLOGY

## 2025-07-08 PROCEDURE — 25000003 PHARM REV CODE 250: Performed by: NURSE PRACTITIONER

## 2025-07-08 PROCEDURE — 11000001 HC ACUTE MED/SURG PRIVATE ROOM

## 2025-07-08 PROCEDURE — 85025 COMPLETE CBC W/AUTO DIFF WBC: CPT

## 2025-07-08 PROCEDURE — 97535 SELF CARE MNGMENT TRAINING: CPT

## 2025-07-08 PROCEDURE — 82247 BILIRUBIN TOTAL: CPT

## 2025-07-08 RX ORDER — CLOPIDOGREL BISULFATE 75 MG/1
75 TABLET ORAL
COMMUNITY

## 2025-07-08 RX ORDER — FUROSEMIDE 40 MG/1
40 TABLET ORAL DAILY
COMMUNITY
Start: 2025-02-27

## 2025-07-08 RX ORDER — ASPIRIN 81 MG/1
81 TABLET ORAL DAILY
COMMUNITY

## 2025-07-08 RX ORDER — DAPAGLIFLOZIN 10 MG/1
10 TABLET, FILM COATED ORAL
COMMUNITY

## 2025-07-08 RX ORDER — AMLODIPINE BESYLATE 10 MG/1
10 TABLET ORAL DAILY
COMMUNITY

## 2025-07-08 RX ORDER — FENOFIBRATE 145 MG/1
145 TABLET, FILM COATED ORAL DAILY
COMMUNITY

## 2025-07-08 RX ORDER — CARVEDILOL 25 MG/1
25 TABLET ORAL DAILY
COMMUNITY

## 2025-07-08 RX ORDER — ROSUVASTATIN CALCIUM 40 MG/1
40 TABLET, COATED ORAL DAILY
COMMUNITY

## 2025-07-08 RX ORDER — POTASSIUM CHLORIDE 750 MG/1
10 TABLET, EXTENDED RELEASE ORAL EVERY MORNING
COMMUNITY

## 2025-07-08 RX ORDER — ACETAMINOPHEN 500 MG
2 TABLET ORAL DAILY
COMMUNITY

## 2025-07-08 RX ORDER — HYDRALAZINE HYDROCHLORIDE 50 MG/1
50 TABLET, FILM COATED ORAL
COMMUNITY

## 2025-07-08 RX ADMIN — CLOPIDOGREL 75 MG: 75 TABLET ORAL at 08:07

## 2025-07-08 RX ADMIN — POTASSIUM BICARBONATE 40 MEQ: 391 TABLET, EFFERVESCENT ORAL at 08:07

## 2025-07-08 RX ADMIN — ASPIRIN 81 MG: 81 TABLET, COATED ORAL at 08:07

## 2025-07-08 RX ADMIN — MUPIROCIN: 20 OINTMENT TOPICAL at 08:07

## 2025-07-08 RX ADMIN — ATORVASTATIN CALCIUM 40 MG: 40 TABLET, FILM COATED ORAL at 08:07

## 2025-07-08 RX ADMIN — PANTOPRAZOLE SODIUM 40 MG: 40 TABLET, DELAYED RELEASE ORAL at 08:07

## 2025-07-08 RX ADMIN — MUPIROCIN: 20 OINTMENT TOPICAL at 09:07

## 2025-07-08 NOTE — NURSING
Called to room by pt and pt's family stating that he wanted to take a shower. Explained to spouse due to him being a fall risk that staff must be in bathroom while he takes a shower. Spouse states that she is able to shower him and that they were refusing to have staff in the bathroom and that she was taking full responsibility if something occurs.

## 2025-07-08 NOTE — PT/OT/SLP PROGRESS
Speech Language Pathology      Yobani Walls  MRN: [id number]    Patient not seen today secondary to Other (Comment) (angio). Will follow-up 7/9.

## 2025-07-08 NOTE — PLAN OF CARE
Problem: Adult Inpatient Plan of Care  Goal: Plan of Care Review  Outcome: Progressing  Goal: Patient-Specific Goal (Individualized)  Outcome: Progressing  Goal: Absence of Hospital-Acquired Illness or Injury  Outcome: Progressing     Problem: Stroke, Ischemic (Includes Transient Ischemic Attack)  Goal: Optimal Coping  Outcome: Progressing  Goal: Effective Bowel Elimination  Outcome: Progressing  Goal: Optimal Cerebral Tissue Perfusion  Outcome: Progressing  Plan of care reviewed with pt and family. Pt voiced understanding. Pt AAOX3. No c/o during the night. No apparent distress noted. Pt NPO for procedure today. Bed in lowest position and locked, call light within reach, side rails X2, and slip resistant socks on at this time. Tele orders maintained. Call light in reach.

## 2025-07-08 NOTE — PT/OT/SLP PROGRESS
"Occupational Therapy   Treatment    Name: Yobani Walls  MRN: [id number]  Admitting Diagnosis:  Embolic stroke involving right carotid artery       Recommendations:     Discharge Recommendations: Low Intensity Therapy  Discharge Equipment Recommendations:  none  Barriers to discharge:  None    Assessment:     Yobani Walls is a 50 y.o. male with a medical diagnosis of Embolic stroke involving right carotid artery.  He presents with performance deficits affecting function are impaired self care skills, impaired functional mobility, impaired endurance, impaired balance.     Rehab Prognosis:  Good; patient would benefit from acute skilled OT services to address these deficits and reach maximum level of function.       Plan:     Patient to be seen 3 x/week to address the above listed problems via self-care/home management, therapeutic activities, neuromuscular re-education  Plan of Care Expires: 08/04/25  Plan of Care Reviewed with: patient, mother    Subjective     Patient:  "I had a good night."  Mother:  "He is walking pretty good."   Pain/Comfort:  Pain Rating 1: 0/10  Pain Rating Post-Intervention 1: 0/10    Objective:     Communicated with: Nurse prior to session.  Patient found right sidelying with bed alarm, telemetry, peripheral IV upon OT entry to room.    General Precautions: Standard, aspiration, fall    Orthopedic Precautions:N/A  Braces: N/A  Respiratory Status: Room air     Occupational Performance:     Bed Mobility:    Patient completed Rolling/Turning to Left with  modified independence  Patient completed Rolling/Turning to Right with modified independence  Patient completed Supine to Sit with modified independence  Patient completed Sit to Supine with modified independence     Functional Mobility/Transfers:  Patient completed Sit <> Stand Transfer with supervision  with  no assistive device   Patient completed Toilet Transfer Stand Pivot technique with supervision with  no AD    Activities of Daily " Living:  Grooming: supervision while standing at sink  Upper Body Dressing: supervision    Lower Body Dressing: supervision    Toileting: supervision with use of std commode    AMPAC 6 Click ADL: 18    Treatment & Education:  Patient alert and oriented x 3; able to follow 4/4 one step commands.  Patient attentive and interactive throughout the session.      Modified Blanchard Scale:  1/6.    Patient left supine with all lines intact, call button in reach, and bed alarm on    GOALS:   Multidisciplinary Problems       Occupational Therapy Goals          Problem: Occupational Therapy    Goal Priority Disciplines Outcome Interventions   Occupational Therapy Goal     OT, PT/OT Progressing    Description: Goals set 7/7 with an expiration date, 8/4:  Patient will increase functional independence with ADLs by performing:  Patient will demonstrate supine -sit with modified independence.   Not met  Patient will demonstrate stand pivot transfers with modified independence.   Not met  Patient will demonstrate grooming while standing with modified independence.   Not met  Patient will demonstrate upper body dressing with modified independence while seated EOB.   Not met  Patient will demonstrate lower body dressing with modified independence while seated EOB.   Not met  Patient will demonstrate toileting with modified independence.   Not met  Patient will demonstrate bathing while seated EOB with modified independence.   Not met  Patient's family / caregiver will demonstrate independence and safety with assisting patient with self-care skills and functional mobility.     Not met  Patient and/or patient's family will verbalize understanding of stroke prevention guidelines, personal risk factors and stroke warning signs via teachback method.  Not met                                Time Tracking:     OT Date of Treatment: 07/08/25  OT Start Time: 0608  OT Stop Time: 0620  OT Total Time (min): 12 min    Billable Minutes:Self Care/Home  Management 12    OT/HELEN: OT          7/8/2025

## 2025-07-08 NOTE — CONSULTS
"Interventional Neuroradiology Pre-procedure Note    History of Present Illness:  Per chart" Yobani Walls is a 50 y.o. male with PMH of HTN, HLD, multiple MI, CHF, CKD, prior stroke (with no residual deficits) that presents as a transfer from [facility] with R intracranial ICA and R M2 occlusion. LKW 9:00pm 07/05/2026. Patient presented to [facility] this morning with left- sided weakness and bizarre behavior. Wife states that patient "seemed drunk" last night, but this morning had difficulty with driving. Patient was evaluated via telestroke. NIH at that time was 3 for left sided drift and L neglect. CTH/CTA revealed occlusion of the distal intracranial ICA with M1 and QUETA being patent and subsequent reocclusion of the R inferior M2 segment. as well as established infarcts in the caudate head and foci in the temporal lobe. Patient was OOW for TNK, IR not recommended due to low NIHSS. Decision made to transfer patient to [facility] for MRI and close monitoring for decompensation. On arrival to floor at [facility], stroke team notified. On exam, patient has significant LUE drift, L facial droop, and dysarthria "    No past medical history on file.  No past surgical history on file.    Review of Systems:   As documented in primary team H&P    Home Meds:   Prior to Admission medications    Medication Sig Start Date End Date Taking? Authorizing Provider   HYDROcodone-acetaminophen (NORCO) 5-325 mg per tablet Take 1 tablet by mouth every 4 (four) hours as needed for Pain. 10/25/20   Tyrese Wong MD     Scheduled Meds:    aspirin  81 mg Oral Daily    atorvastatin  40 mg Oral Daily    clopidogreL  75 mg Oral Daily    mupirocin   Nasal BID    pantoprazole  40 mg Oral Daily    potassium bicarbonate  40 mEq Oral Once     Continuous Infusions:   PRN Meds:  Current Facility-Administered Medications:     baclofen, 5 mg, Oral, TID PRN    bisacodyL, 10 mg, Rectal, Daily PRN    hydrALAZINE, 10 mg, Intravenous, Q6H PRN    ondansetron, " 4 mg, Intravenous, Q12H PRN    sodium chloride 0.9%, 500 mL, Intravenous, PRN    sodium chloride 0.9%, 10 mL, Intravenous, PRN  Anticoagulants/Antiplatelets: no anticoagulation    Allergies: Review of patient's allergies indicates:  No Known Allergies  Sedation Hx: have not been any systemic reactions    Labs:  Recent Labs   Lab 07/07/25 0532   INR 1.0       Recent Labs   Lab 07/08/25 0344   WBC 6.39   HGB 15.2   HCT 45.4   MCV 85         Recent Labs   Lab 07/07/25 0532 07/08/25 0344   GLU 86 78    141   K 3.4* 3.6   * 109   CO2 25 23   BUN 14 17   CREATININE 1.8* 1.8*   CALCIUM 9.1 9.0   MG 1.9  --    ALT 8* 8*   AST 15 16   ALBUMIN 3.8 3.5   BILITOT 1.0 0.9         Vitals:  Temp: 97.6 °F (36.4 °C) (07/08/25 0319)  Pulse: (!) 46 (07/08/25 0724)  Resp: 16 (07/08/25 0319)  BP: (!) 153/87 (07/08/25 0319)  SpO2: 98 % (07/08/25 0319)     Physical Exam:  ASA: 3  Mallampati: 2    Neuro: alert and oriented * 3  Left facial droop  Left arm drift  dysarthria      Plan:  Sedation Plan: general anesthesia  Patient will undergo: Patient will undergo cerebral angiogram for detailed evaluation of intracranial vasculature as a part of stroke work-up.   Informed consent obtained. Risk vs benefits explained. Patient agrees to procedure.      Mansoor Dwyer MD  Fellow, NeuroEndovascular Surgery, [facility]

## 2025-07-08 NOTE — PLAN OF CARE
Problem: Physical Therapy  Goal: Physical Therapy Goal  Description: Goals to be met by: 25     Patient will increase functional independence with mobility by performin. Sit to stand transfer with [place]  2. Bed to chair transfer with [place] using No Assistive Device  3. Gait  x 75 feet with [place] using No Assistive Device.   4. Stand for 5 minutes with Supervision using No Assistive Device    Outcome: Progressing

## 2025-07-08 NOTE — SUBJECTIVE & OBJECTIVE
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    HPI, Past Medical, Family, and Social History remains the same as documented in the initial encounter.     Review of Systems   Neurological:  Positive for facial asymmetry, speech difficulty and weakness. Negative for headaches.     Scheduled Meds:   aspirin  81 mg Oral Daily    atorvastatin  40 mg Oral Daily    clopidogreL  75 mg Oral Daily    mupirocin   Nasal BID    pantoprazole  40 mg Oral Daily     PRN Meds:  Current Facility-Administered Medications:     baclofen, 5 mg, Oral, TID PRN    bisacodyL, 10 mg, Rectal, Daily PRN    hydrALAZINE, 10 mg, Intravenous, Q6H PRN    ondansetron, 4 mg, Intravenous, Q12H PRN    sodium chloride 0.9%, 500 mL, Intravenous, PRN    sodium chloride 0.9%, 10 mL, Intravenous, PRN    Objective:     Vital Signs (Most Recent):  Temp: 98.2 °F (36.8 °C) (07/08/25 0738)  Pulse: (!) 48 (07/08/25 1038)  Resp: 16 (07/08/25 0319)  BP: (!) 163/97 (07/08/25 0738)  SpO2: 97 % (07/08/25 0738)  BP Location: Left arm    Vital Signs Range (Last 24H):  Temp:  [97.6 °F (36.4 °C)-98.9 °F (37.2 °C)]   Pulse:  [45-59]   Resp:  [16-18]   BP: (153-164)/(87-99)   SpO2:  [97 %-100 %]   BP Location: Left arm       Physical Exam  Vitals and nursing note reviewed.   HENT:      Head: Normocephalic.   Eyes:      Extraocular Movements: Extraocular movements intact.      Pupils: Pupils are equal, round, and reactive to light.   Pulmonary:      Effort: Pulmonary effort is normal. No respiratory distress.   Skin:     General: Skin is warm and dry.   Neurological:      Mental Status: He is alert and oriented to person, place, and time.      Motor: Weakness present.          Neurological Exam:   LOC: alert  Attention Span: Good   Language: No aphasia  Articulation: Dysarthria  Orientation: Person, Place,  Time   Visual Fields: Full  EOM (CN III, IV, VI): Full/intact  Pupils (CN II, III): PERRL  Facial Movement (CN VII): Lower facial weakness on the Left  Motor: Arm left  Normal 5/5  Leg left  Paresis: 3/5  Arm right  Normal 5/5  Leg right Paresis: 4/5  Cerebellum: No evidence of appendicular or axial ataxia  Sensation: intact to light touch    Laboratory:  CMP:   Recent Labs   Lab 07/08/25  0344   CALCIUM 9.0   ALBUMIN 3.5   PROT 5.9*      K 3.6   CO2 23      BUN 17   CREATININE 1.8*   ALKPHOS 40   ALT 8*   AST 16   BILITOT 0.9     BMP:   Recent Labs   Lab 07/08/25  0344      K 3.6      CO2 23   BUN 17   CREATININE 1.8*   CALCIUM 9.0     CBC:   Recent Labs   Lab 07/08/25  0344   WBC 6.39   RBC 5.34   HGB 15.2   HCT 45.4      MCV 85   MCH 28.5   MCHC 33.5     Lipid Panel:   Recent Labs   Lab 07/06/25  1630   CHOL 222*   LDLCALC 136.0   HDL 50   TRIG 180*     Coagulation:   Recent Labs   Lab 07/07/25  0532   INR 1.0   APTT 27.3     Hgb A1C:   Recent Labs   Lab 07/06/25  1632   HGBA1C 5.3     TSH:   Recent Labs   Lab 07/06/25  1630   TSH 1.861       Diagnostic Results     Brain Imaging   MRI Brain w/o  - Acute infarction of the right cerebral hemisphere involving the right caudate head, right amygdala, right subinsular cortex, and portions of the right frontal cortex.  - No evidence of hemorrhagic conversion, mass effect, or midline shift.    CT Head 07/06/2025  Acute/subacute infarct right head of the caudate nucleus.     Vessel Imaging  Diagnostic angiography 7/8/2025  Pending     CTA Brain 07/06/2025  1. Occluded distal right ICA.  2. Severe stenosis right and left M2 segments.  3. Subjectively decreased enhancement the right sylvian branches.     CTA Neck 07/06/2025  Tapered narrowing of the mid/distal ICA resulting in complete occlusion.    Cardiac Imaging   Echo     Left Ventricle: The left ventricle is normal in size. Normal wall thickness. There is eccentric hypertrophy. Mild  global hypokinesis and regional wall motion abnormalities present. See diagram for wall motion findings. There is moderately reduced systolic function with a visually estimated ejection fraction of 35 - 40%. Ejection fraction is approximately 38%. Grade II diastolic dysfunction.    Right Ventricle: The right ventricle is normal in size measuring 2.8 cm. Wall thickness is normal. Systolic function is normal. Pacemaker lead present in the ventricle.    Left Atrium: The left atrium is moderately dilated    Right Atrium: Lead present in the right atrium.    Aortic Valve: The aortic valve is a trileaflet valve. There is mild aortic valve sclerosis. There is mild aortic regurgitation.    Pulmonary Artery: The estimated pulmonary artery systolic pressure is 28 mmHg.    IVC/SVC: Normal venous pressure at 3 mmHg.

## 2025-07-08 NOTE — NURSING
Dr. Dawson at the bedside, notified pt his procedure is pushed to tomorrow. Pt nurse CHRIS Amato updated on the plan.

## 2025-07-08 NOTE — PROGRESS NOTES
"[facility] - Neurosurgery ([facility])  Vascular Neurology  Comprehensive Stroke Center  Progress Note    Assessment/Plan:     * Embolic stroke involving right carotid artery  Yobani Walls is a 50 y.o. male with PMH of HTN, HLD,  multiple MI, CHF, CKD, prior stroke (with no residual deficits) that presents as a transfer from [facility] with R intracranial ICA and R M2 occlusion. LKW 9:00pm 07/05/2026. Patient presented to [facility] this morning with left- sided weakness and bizarre behavior. Wife states that patient "seemed drunk" last night, but this morning had difficulty with driving. Patient was evaluated via telestroke. NIH at that time was 3 for left sided drift and L neglect. CTH/CTA revealed occlusion of the distal intracranial ICA with M1 and QUETA being patent and subsequent reocclusion of the R  inferior M2 segment.  as well as established infarcts in the caudate head and foci in the temporal lobe. Patient was OOW for TNK, IR not recommended due to low NIHSS. Decision made to transfer patient to [facility] for MRI and close monitoring for decompensation. On arrival to floor at [facility], stroke team notified. On exam, patient has significant LUE drift, L facial droop, and dysarthria. NIHSS now 6. Patient stood up to go to the bathroom, nurse noted that at this time, patient nearly slumped to the ground due to severe LSW. STAT MRI, ASA load, and HOB Flat ordered. Patient has a defibrillator, device required interrogation prior to MRI.     Discussed worsening exam with VN attending. Will obtain MRI prior to making decision on thrombectomy. Delay in obtaining MRI due to need for device interrogation. Interrogation complete and device switched to MRI mode by cardiology fellow, however, MRI staff requested device information. Device information included in a care update by cardiology fellow, however, MRI staff then stated protocol requires device to be programmed in MRI, not on the floor. This caused further delay in " getting patient to MRI. Cardiology fellow and radiology on call notified to expedite MRI process.     Etiology unclear at this time given fluctuating presentation with imaging showing areas of dilatation - will compare with imaging from prior TIA at OSH.     7/8/2025 Improved left hemiparesis particularly in the LUE. Slightly improved L facial palsy with mild dysarthria. Plan for diagnostic angio today, otherwise medically appropriate for discharge. Pt prefers home with outpatient therapy.    Antithrombotics for secondary stroke prevention: Antiplatelets: Aspirin: 81 mg daily  Clopidogrel: 75 mg daily    Statins for secondary stroke prevention and hyperlipidemia, if present:   Statins: Atorvastatin- 40 mg daily    Aggressive risk factor modification: HTN, HLD, Diet, Exercise     Rehab efforts: The patient has been evaluated by a stroke team provider and the therapy needs have been fully considered based off the presenting complaints and exam findings. The following therapy evaluations are needed: PT evaluate and treat, OT evaluate and treat, SLP evaluate and treat, PM&R evaluate for appropriate placement    Diagnostics ordered/pending: Diagnostic angio pending    VTE prophylaxis: Heparin 5000 units SQ every 8 hours  Mechanical prophylaxis: Place SCDs    BP parameters: Infarct: No intervention, SBP <220    CKD (chronic kidney disease)  History of chronic kidney disease noted.     Plan:   - avoid nephrotoxic medications   - renally dose medications   - monitor with daily labs     Hypertension  Patient is on the following at home per chart review from care everywhere: amlodipine 10 mg qd, carvedilol 25 mg qd, lasix 40 mg qd, hydralazine 50 mg unspecified number of times per day.     Plan:   - hold hypertension medications in setting of permissive hypertension, will add back as clinically indicated.     Hyperlipidemia  Patient is on the following at home per care everywhere chart review: rosuvastatin 40 mg qd,  fenofibrate 145 mg unspecified number of times per day.     Plan:   - will substitute rosuvastatin for atorvastatin   - continue fenofibrate    CHF (congestive heart failure)  Patient has extensive history of congestive heart failure. Per chart review from care everywhere, he is on the following regimen at home: carvedilol 25 mg qd, entresto  mg, farxiga 10 mg qd, lasix 40 mg qd.     Plan:   - will hold GDMT initially in the setting of permissive hypertension. Will add back as clinically indicated.          7/7/2025 Stable neurological exam - NIHSS 6 with left facial palsy, LUE/LLE weakness, and mild dysarthria. Plan for diagnostic angiography to evaluate R MCA fusiform pattern seen on vessel imaging.  7/8/2025 Improved left hemiparesis particularly in the LUE. Slightly improved L facial palsy with mild dysarthria. Plan for diagnostic angio today, otherwise medically appropriate for discharge. Pt prefers home with outpatient therapy.    STROKE DOCUMENTATION        NIH Scale:  1a. Level of Consciousness: 0-->Alert, keenly responsive  1b. LOC Questions: 0-->Answers both questions correctly  1c. LOC Commands: 0-->Performs both tasks correctly  2. Best Gaze: 0-->Normal  3. Visual: 0-->No visual loss  4. Facial Palsy: 2-->Partial paralysis (total or near-total paralysis of lower face)  5a. Motor Arm, Left: 0-->No drift, limb holds 90 (or 45) degrees for full 10 secs  5b. Motor Arm, Right: 0-->No drift, limb holds 90 (or 45) degrees for full 10 secs  6a. Motor Leg, Left: 1-->Drift, leg falls by the end of the 5-sec period but does not hit bed  6b. Motor Leg, Right: 0-->No drift, leg holds 30 degree position for full 5 secs  7. Limb Ataxia: 0-->Absent  8. Sensory: 0-->Normal, no sensory loss  9. Best Language: 0-->No aphasia, normal  10. Dysarthria: 1-->Mild-to-moderate dysarthria, patient slurs at least some words and, at worst, can be understood with some difficulty  11. Extinction and Inattention (formerly  Neglect): 0-->No abnormality  Total (NIH Stroke Scale): 4       Modified Gustine Score: 0  Prakash Coma Scale:    ABCD2 Score:    [id number] Score:   HAS -BLED Score:   ICH Score:   Hunt & Hackett Classification:      Hemorrhagic change of an Ischemic Stroke: Does this patient have an ischemic stroke with hemorrhagic changes? No     Neurologic Chief Complaint: R carotid ICA embolism of unclear etiology    Subjective:     Interval History: Patient is seen for follow-up neurological assessment and treatment recommendations: See hospital course.    HPI, Past Medical, Family, and Social History remains the same as documented in the initial encounter.     Review of Systems   Neurological:  Positive for facial asymmetry, speech difficulty and weakness. Negative for headaches.     Scheduled Meds:   aspirin  81 mg Oral Daily    atorvastatin  40 mg Oral Daily    clopidogreL  75 mg Oral Daily    mupirocin   Nasal BID    pantoprazole  40 mg Oral Daily     PRN Meds:  Current Facility-Administered Medications:     baclofen, 5 mg, Oral, TID PRN    bisacodyL, 10 mg, Rectal, Daily PRN    hydrALAZINE, 10 mg, Intravenous, Q6H PRN    ondansetron, 4 mg, Intravenous, Q12H PRN    sodium chloride 0.9%, 500 mL, Intravenous, PRN    sodium chloride 0.9%, 10 mL, Intravenous, PRN    Objective:     Vital Signs (Most Recent):  Temp: 98.2 °F (36.8 °C) (07/08/25 0738)  Pulse: (!) 48 (07/08/25 1038)  Resp: 16 (07/08/25 0319)  BP: (!) 163/97 (07/08/25 0738)  SpO2: 97 % (07/08/25 0738)  BP Location: Left arm    Vital Signs Range (Last 24H):  Temp:  [97.6 °F (36.4 °C)-98.9 °F (37.2 °C)]   Pulse:  [45-59]   Resp:  [16-18]   BP: (153-164)/(87-99)   SpO2:  [97 %-100 %]   BP Location: Left arm       Physical Exam  Vitals and nursing note reviewed.   HENT:      Head: Normocephalic.   Eyes:      Extraocular Movements: Extraocular movements intact.      Pupils: Pupils are equal, round, and reactive to light.   Pulmonary:      Effort: Pulmonary effort is  normal. No respiratory distress.   Skin:     General: Skin is warm and dry.   Neurological:      Mental Status: He is alert and oriented to person, place, and time.      Motor: Weakness present.          Neurological Exam:   LOC: alert  Attention Span: Good   Language: No aphasia  Articulation: Dysarthria  Orientation: Person, Place, Time   Visual Fields: Full  EOM (CN III, IV, VI): Full/intact  Pupils (CN II, III): PERRL  Facial Movement (CN VII): Lower facial weakness on the Left  Motor: Arm left  Normal 5/5  Leg left  Paresis: 3/5  Arm right  Normal 5/5  Leg right Paresis: 4/5  Cerebellum: No evidence of appendicular or axial ataxia  Sensation: intact to light touch    Laboratory:  CMP:   Recent Labs   Lab 07/08/25  0344   CALCIUM 9.0   ALBUMIN 3.5   PROT 5.9*      K 3.6   CO2 23      BUN 17   CREATININE 1.8*   ALKPHOS 40   ALT 8*   AST 16   BILITOT 0.9     BMP:   Recent Labs   Lab 07/08/25  0344      K 3.6      CO2 23   BUN 17   CREATININE 1.8*   CALCIUM 9.0     CBC:   Recent Labs   Lab 07/08/25  0344   WBC 6.39   RBC 5.34   HGB 15.2   HCT 45.4      MCV 85   MCH 28.5   MCHC 33.5     Lipid Panel:   Recent Labs   Lab 07/06/25  1630   CHOL 222*   LDLCALC 136.0   HDL 50   TRIG 180*     Coagulation:   Recent Labs   Lab 07/07/25  0532   INR 1.0   APTT 27.3     Hgb A1C:   Recent Labs   Lab 07/06/25  1632   HGBA1C 5.3     TSH:   Recent Labs   Lab 07/06/25  1630   TSH 1.861       Diagnostic Results     Brain Imaging   MRI Brain w/o  - Acute infarction of the right cerebral hemisphere involving the right caudate head, right amygdala, right subinsular cortex, and portions of the right frontal cortex.  - No evidence of hemorrhagic conversion, mass effect, or midline shift.    CT Head 07/06/2025  Acute/subacute infarct right head of the caudate nucleus.     Vessel Imaging  Diagnostic angiography 7/8/2025  Pending     CTA Brain 07/06/2025  1. Occluded distal right ICA.  2. Severe stenosis right  and left M2 segments.  3. Subjectively decreased enhancement the right sylvian branches.     CTA Neck 07/06/2025  Tapered narrowing of the mid/distal ICA resulting in complete occlusion.    Cardiac Imaging   Echo     Left Ventricle: The left ventricle is normal in size. Normal wall thickness. There is eccentric hypertrophy. Mild global hypokinesis and regional wall motion abnormalities present. See diagram for wall motion findings. There is moderately reduced systolic function with a visually estimated ejection fraction of 35 - 40%. Ejection fraction is approximately 38%. Grade II diastolic dysfunction.    Right Ventricle: The right ventricle is normal in size measuring 2.8 cm. Wall thickness is normal. Systolic function is normal. Pacemaker lead present in the ventricle.    Left Atrium: The left atrium is moderately dilated    Right Atrium: Lead present in the right atrium.    Aortic Valve: The aortic valve is a trileaflet valve. There is mild aortic valve sclerosis. There is mild aortic regurgitation.    Pulmonary Artery: The estimated pulmonary artery systolic pressure is 28 mmHg.    IVC/SVC: Normal venous pressure at 3 mmHg.    Godwin Campos MD  Comprehensive Stroke Center  Department of Vascular Neurology   [facility])

## 2025-07-08 NOTE — ASSESSMENT & PLAN NOTE
"Yobani Walls is a 50 y.o. male with PMH of HTN, HLD,  multiple MI, CHF, CKD, prior stroke (with no residual deficits) that presents as a transfer from [facility] with R intracranial ICA and R M2 occlusion. LKW 9:00pm 07/05/2026. Patient presented to [facility] this morning with left- sided weakness and bizarre behavior. Wife states that patient "seemed drunk" last night, but this morning had difficulty with driving. Patient was evaluated via telestroke. NIH at that time was 3 for left sided drift and L neglect. CTH/CTA revealed occlusion of the distal intracranial ICA with M1 and QUETA being patent and subsequent reocclusion of the R  inferior M2 segment.  as well as established infarcts in the caudate head and foci in the temporal lobe. Patient was OOW for TNK, IR not recommended due to low NIHSS. Decision made to transfer patient to [facility] for MRI and close monitoring for decompensation. On arrival to floor at [facility], stroke team notified. On exam, patient has significant LUE drift, L facial droop, and dysarthria. NIHSS now 6. Patient stood up to go to the bathroom, nurse noted that at this time, patient nearly slumped to the ground due to severe LSW. STAT MRI, ASA load, and HOB Flat ordered. Patient has a defibrillator, device required interrogation prior to MRI.     Discussed worsening exam with VN attending. Will obtain MRI prior to making decision on thrombectomy. Delay in obtaining MRI due to need for device interrogation. Interrogation complete and device switched to MRI mode by cardiology fellow, however, MRI staff requested device information. Device information included in a care update by cardiology fellow, however, MRI staff then stated protocol requires device to be programmed in MRI, not on the floor. This caused further delay in getting patient to MRI. Cardiology fellow and radiology on call notified to expedite MRI process.     Etiology unclear at this time given fluctuating presentation with imaging " showing areas of dilatation - will compare with imaging from prior TIA at OSH.     7/8/2025 Improved left hemiparesis particularly in the LUE. Slightly improved L facial palsy with mild dysarthria. Plan for diagnostic angio today, otherwise medically appropriate for discharge. Pt prefers home with outpatient therapy.    Antithrombotics for secondary stroke prevention: Antiplatelets: Aspirin: 81 mg daily  Clopidogrel: 75 mg daily    Statins for secondary stroke prevention and hyperlipidemia, if present:   Statins: Atorvastatin- 40 mg daily    Aggressive risk factor modification: HTN, HLD, Diet, Exercise     Rehab efforts: The patient has been evaluated by a stroke team provider and the therapy needs have been fully considered based off the presenting complaints and exam findings. The following therapy evaluations are needed: PT evaluate and treat, OT evaluate and treat, SLP evaluate and treat, PM&R evaluate for appropriate placement    Diagnostics ordered/pending: Diagnostic angio pending    VTE prophylaxis: Heparin 5000 units SQ every 8 hours  Mechanical prophylaxis: Place SCDs    BP parameters: Infarct: No intervention, SBP <220

## 2025-07-08 NOTE — PT/OT/SLP PROGRESS
Physical Therapy Treatment    Patient Name:  Yobani Walls   MRN:  [id number]    Recommendations:     Discharge Recommendations: Low Intensity Therapy  Discharge Equipment Recommendations: none  Barriers to discharge: None    Assessment:     Yobani Walls is a 50 y.o. male admitted with a medical diagnosis of Embolic stroke involving right carotid artery.  He presents with the following impairments/functional limitations: impaired functional mobility, gait instability, impaired balance, decreased coordination, decreased lower extremity function, decreased upper extremity function. Patient participated well in session. He continues to demonstrate gait deficits especially scissoring and lateral sway to L however overall balance and gait quality is much improved compared to evaluation. Currently recommending low intensity post acute therapy with patient educated on safety precautions at home.   Patient is safe to ambulate/transfer with nursing (assist of 1), encouraged to sit up in chair when able.      Rehab Prognosis: Good; patient would benefit from acute skilled PT services to address these deficits and reach maximum level of function.    Recent Surgery: * No surgery found *      Plan:     During this hospitalization, patient to be seen 4 x/week to address the identified rehab impairments via gait training, therapeutic activities, therapeutic exercises, neuromuscular re-education and progress toward the following goals:    Plan of Care Expires:  07/21/25    Subjective     Chief Complaint: soreness from hiccups the day prior   Patient/Family Comments/goals: to get better  Pain/Comfort:  Pain Rating 1: 0/10  Pain Rating Post-Intervention 1: 0/10      Objective:     Communicated with DESIRAE Amato prior to session.  Patient found HOB elevated with bed alarm, telemetry, SCD upon PT entry to room. Mom in room.     General Precautions: Standard, fall, aspiration  Orthopedic Precautions: N/A  Braces: N/A  Respiratory Status: Room  air     Functional Mobility:  Bed Mobility:     Scooting: stand by assistance  Supine to Sit: stand by assistance  Sit to Supine: stand by assistance    Transfers:     Sit to Stand:  stand by assistance with no AD  Toilet: SBA no AD    Gait:   Patient ambulated 50' with CGA no AD  Deviations Noted: narrow PHIL (occasional scissoring) and increased lateral sway L, some L inattention during ambulation    Patient ambulated x10' each gait challenges: (CGA-Steven)  Ambulating backwards  Ambulating eyes closed  Ambulating heel toe   Ambulating with head turns (horizontal, vertical)     Balance:   Sitting static: independent  Sitting dynamic: SBA  Standing static: SBA  Standing dynamic: CGA       AM-PAC 6 CLICK MOBILITY  Turning over in bed (including adjusting bedclothes, sheets and blankets)?: 4  Sitting down on and standing up from a chair with arms (e.g., wheelchair, bedside commode, etc.): 4  Moving from lying on back to sitting on the side of the bed?: 4  Moving to and from a bed to a chair (including a wheelchair)?: 4  Need to walk in hospital room?: 3  Climbing 3-5 steps with a railing?: 3  Basic Mobility Total Score: 22       Treatment & Education:  Cues during ambulation for sequencing, weight shifting, upright posture throughout, appropriate step length and height.    Education: patient educated on POC, need for therapy, safety with mobility, discharge recommendations and equipment recommendations. Patient verbalized understanding of all topics.      Patient left HOB elevated with all lines intact, call button in reach, and bed alarm on.    GOALS:   Multidisciplinary Problems       Physical Therapy Goals          Problem: Physical Therapy    Goal Priority Disciplines Outcome Interventions   Physical Therapy Goal     PT, PT/OT Progressing    Description: Goals to be met by: 25     Patient will increase functional independence with mobility by performin. Sit to stand transfer with [place]  2. Bed to  chair transfer with [place] using No Assistive Device  3. Gait  x 75 feet with [place] using No Assistive Device.   4. Stand for 5 minutes with Supervision using No Assistive Device                         Time Tracking:     PT Received On: 07/08/25  PT Start Time: 1042     PT Stop Time: 1056  PT Total Time (min): 14 min     Billable Minutes: Gait Training 14 minutes    Treatment Type: Treatment  PT/PTA: PT     Number of PTA visits since last PT visit: 0     07/08/2025

## 2025-07-09 ENCOUNTER — DOCUMENTATION ONLY (OUTPATIENT)
Dept: ELECTROPHYSIOLOGY | Facility: CLINIC | Age: 50
End: 2025-07-09
Payer: MEDICARE

## 2025-07-09 ENCOUNTER — ANESTHESIA (OUTPATIENT)
Dept: INTERVENTIONAL RADIOLOGY/VASCULAR | Facility: HOSPITAL | Age: 50
End: 2025-07-09
Payer: MEDICARE

## 2025-07-09 LAB
ABSOLUTE EOSINOPHIL (OHS): 0.15 K/UL
ABSOLUTE MONOCYTE (OHS): 0.62 K/UL (ref 0.3–1)
ABSOLUTE NEUTROPHIL COUNT (OHS): 3.04 K/UL (ref 1.8–7.7)
ALBUMIN SERPL BCP-MCNC: 3.5 G/DL (ref 3.5–5.2)
ALP SERPL-CCNC: 36 UNIT/L (ref 40–150)
ALT SERPL W/O P-5'-P-CCNC: 7 UNIT/L (ref 10–44)
ANION GAP (OHS): 11 MMOL/L (ref 8–16)
AST SERPL-CCNC: 14 UNIT/L (ref 11–45)
BASOPHILS # BLD AUTO: 0.04 K/UL
BASOPHILS NFR BLD AUTO: 0.7 %
BILIRUB SERPL-MCNC: 1.1 MG/DL (ref 0.1–1)
BUN SERPL-MCNC: 20 MG/DL (ref 6–20)
CALCIUM SERPL-MCNC: 8.9 MG/DL (ref 8.7–10.5)
CHLORIDE SERPL-SCNC: 110 MMOL/L (ref 95–110)
CO2 SERPL-SCNC: 23 MMOL/L (ref 23–29)
CREAT SERPL-MCNC: 1.8 MG/DL (ref 0.5–1.4)
ERYTHROCYTE [DISTWIDTH] IN BLOOD BY AUTOMATED COUNT: 14.3 % (ref 11.5–14.5)
GFR SERPLBLD CREATININE-BSD FMLA CKD-EPI: 46 ML/MIN/1.73/M2
GLUCOSE SERPL-MCNC: 80 MG/DL (ref 70–110)
HCT VFR BLD AUTO: 43.2 % (ref 40–54)
HGB BLD-MCNC: 14.6 GM/DL (ref 14–18)
IMM GRANULOCYTES # BLD AUTO: 0.02 K/UL (ref 0–0.04)
IMM GRANULOCYTES NFR BLD AUTO: 0.4 % (ref 0–0.5)
LYMPHOCYTES # BLD AUTO: 1.79 K/UL (ref 1–4.8)
MCH RBC QN AUTO: 28.5 PG (ref 27–31)
MCHC RBC AUTO-ENTMCNC: 33.8 G/DL (ref 32–36)
MCV RBC AUTO: 84 FL (ref 82–98)
NUCLEATED RBC (/100WBC) (OHS): 0 /100 WBC
PLATELET # BLD AUTO: 192 K/UL (ref 150–450)
PMV BLD AUTO: 11.3 FL (ref 9.2–12.9)
POTASSIUM SERPL-SCNC: 3.8 MMOL/L (ref 3.5–5.1)
PROT SERPL-MCNC: 5.9 GM/DL (ref 6–8.4)
RBC # BLD AUTO: 5.12 M/UL (ref 4.6–6.2)
RELATIVE EOSINOPHIL (OHS): 2.7 %
RELATIVE LYMPHOCYTE (OHS): 31.6 % (ref 18–48)
RELATIVE MONOCYTE (OHS): 11 % (ref 4–15)
RELATIVE NEUTROPHIL (OHS): 53.6 % (ref 38–73)
SODIUM SERPL-SCNC: 144 MMOL/L (ref 136–145)
WBC # BLD AUTO: 5.66 K/UL (ref 3.9–12.7)

## 2025-07-09 PROCEDURE — 25000003 PHARM REV CODE 250: Performed by: NURSE ANESTHETIST, CERTIFIED REGISTERED

## 2025-07-09 PROCEDURE — B31FYZZ FLUOROSCOPY OF LEFT VERTEBRAL ARTERY USING OTHER CONTRAST: ICD-10-PCS | Performed by: RADIOLOGY

## 2025-07-09 PROCEDURE — 99233 SBSQ HOSP IP/OBS HIGH 50: CPT | Mod: GC,,, | Performed by: PSYCHIATRY & NEUROLOGY

## 2025-07-09 PROCEDURE — 97535 SELF CARE MNGMENT TRAINING: CPT

## 2025-07-09 PROCEDURE — 63600175 PHARM REV CODE 636 W HCPCS

## 2025-07-09 PROCEDURE — 25500020 PHARM REV CODE 255: Performed by: PSYCHIATRY & NEUROLOGY

## 2025-07-09 PROCEDURE — B315YZZ FLUOROSCOPY OF BILATERAL COMMON CAROTID ARTERIES USING OTHER CONTRAST: ICD-10-PCS | Performed by: RADIOLOGY

## 2025-07-09 PROCEDURE — B318YZZ FLUOROSCOPY OF BILATERAL INTERNAL CAROTID ARTERIES USING OTHER CONTRAST: ICD-10-PCS | Performed by: RADIOLOGY

## 2025-07-09 PROCEDURE — 63600175 PHARM REV CODE 636 W HCPCS: Performed by: NURSE ANESTHETIST, CERTIFIED REGISTERED

## 2025-07-09 PROCEDURE — 36415 COLL VENOUS BLD VENIPUNCTURE: CPT

## 2025-07-09 PROCEDURE — 25000003 PHARM REV CODE 250: Performed by: NURSE PRACTITIONER

## 2025-07-09 PROCEDURE — 11000001 HC ACUTE MED/SURG PRIVATE ROOM

## 2025-07-09 PROCEDURE — B31CYZZ FLUOROSCOPY OF BILATERAL EXTERNAL CAROTID ARTERIES USING OTHER CONTRAST: ICD-10-PCS | Performed by: RADIOLOGY

## 2025-07-09 PROCEDURE — 80053 COMPREHEN METABOLIC PANEL: CPT

## 2025-07-09 PROCEDURE — 92507 TX SP LANG VOICE COMM INDIV: CPT

## 2025-07-09 PROCEDURE — 25000003 PHARM REV CODE 250

## 2025-07-09 PROCEDURE — 85025 COMPLETE CBC W/AUTO DIFF WBC: CPT

## 2025-07-09 RX ORDER — SODIUM CHLORIDE 0.9 % (FLUSH) 0.9 %
10 SYRINGE (ML) INJECTION
Status: DISCONTINUED | OUTPATIENT
Start: 2025-07-09 | End: 2025-07-10 | Stop reason: HOSPADM

## 2025-07-09 RX ORDER — PROPOFOL 10 MG/ML
VIAL (ML) INTRAVENOUS
Status: DISCONTINUED | OUTPATIENT
Start: 2025-07-09 | End: 2025-07-09

## 2025-07-09 RX ORDER — IODIXANOL 320 MG/ML
100 INJECTION, SOLUTION INTRAVASCULAR
Status: COMPLETED | OUTPATIENT
Start: 2025-07-09 | End: 2025-07-09

## 2025-07-09 RX ORDER — ACETAMINOPHEN 325 MG/1
650 TABLET ORAL EVERY 6 HOURS PRN
Status: DISCONTINUED | OUTPATIENT
Start: 2025-07-09 | End: 2025-07-10 | Stop reason: HOSPADM

## 2025-07-09 RX ORDER — ONDANSETRON HYDROCHLORIDE 2 MG/ML
INJECTION, SOLUTION INTRAVENOUS
Status: DISCONTINUED | OUTPATIENT
Start: 2025-07-09 | End: 2025-07-09

## 2025-07-09 RX ORDER — FENTANYL CITRATE 50 UG/ML
25 INJECTION, SOLUTION INTRAMUSCULAR; INTRAVENOUS EVERY 5 MIN PRN
Refills: 0 | Status: DISCONTINUED | OUTPATIENT
Start: 2025-07-09 | End: 2025-07-10 | Stop reason: HOSPADM

## 2025-07-09 RX ORDER — FENTANYL CITRATE 50 UG/ML
INJECTION, SOLUTION INTRAMUSCULAR; INTRAVENOUS
Status: DISCONTINUED | OUTPATIENT
Start: 2025-07-09 | End: 2025-07-09

## 2025-07-09 RX ORDER — ROCURONIUM BROMIDE 10 MG/ML
INJECTION, SOLUTION INTRAVENOUS
Status: DISCONTINUED | OUTPATIENT
Start: 2025-07-09 | End: 2025-07-09

## 2025-07-09 RX ORDER — GLUCAGON 1 MG
1 KIT INJECTION
Status: DISCONTINUED | OUTPATIENT
Start: 2025-07-09 | End: 2025-07-10 | Stop reason: HOSPADM

## 2025-07-09 RX ORDER — HALOPERIDOL LACTATE 5 MG/ML
0.5 INJECTION, SOLUTION INTRAMUSCULAR EVERY 10 MIN PRN
Status: DISCONTINUED | OUTPATIENT
Start: 2025-07-09 | End: 2025-07-10 | Stop reason: HOSPADM

## 2025-07-09 RX ORDER — LIDOCAINE HYDROCHLORIDE 20 MG/ML
INJECTION INTRAVENOUS
Status: DISCONTINUED | OUTPATIENT
Start: 2025-07-09 | End: 2025-07-09

## 2025-07-09 RX ADMIN — ROCURONIUM BROMIDE 20 MG: 10 INJECTION, SOLUTION INTRAVENOUS at 02:07

## 2025-07-09 RX ADMIN — ACETAMINOPHEN 650 MG: 325 TABLET ORAL at 09:07

## 2025-07-09 RX ADMIN — FENTANYL CITRATE 25 MCG: 50 INJECTION, SOLUTION INTRAMUSCULAR; INTRAVENOUS at 07:07

## 2025-07-09 RX ADMIN — FENTANYL CITRATE 100 MCG: 50 INJECTION, SOLUTION INTRAMUSCULAR; INTRAVENOUS at 02:07

## 2025-07-09 RX ADMIN — CLOPIDOGREL 75 MG: 75 TABLET ORAL at 08:07

## 2025-07-09 RX ADMIN — MUPIROCIN: 20 OINTMENT TOPICAL at 09:07

## 2025-07-09 RX ADMIN — BACLOFEN 5 MG: 5 TABLET ORAL at 10:07

## 2025-07-09 RX ADMIN — ROCURONIUM BROMIDE 40 MG: 10 INJECTION, SOLUTION INTRAVENOUS at 02:07

## 2025-07-09 RX ADMIN — FENTANYL CITRATE 25 MCG: 50 INJECTION, SOLUTION INTRAMUSCULAR; INTRAVENOUS at 06:07

## 2025-07-09 RX ADMIN — ONDANSETRON 4 MG: 2 INJECTION INTRAMUSCULAR; INTRAVENOUS at 03:07

## 2025-07-09 RX ADMIN — IODIXANOL 90 ML: 320 INJECTION, SOLUTION INTRAVASCULAR at 03:07

## 2025-07-09 RX ADMIN — SUGAMMADEX 200 MG: 100 INJECTION, SOLUTION INTRAVENOUS at 03:07

## 2025-07-09 RX ADMIN — ASPIRIN 81 MG: 81 TABLET, COATED ORAL at 08:07

## 2025-07-09 RX ADMIN — LIDOCAINE HYDROCHLORIDE 100 MG: 20 INJECTION INTRAVENOUS at 02:07

## 2025-07-09 RX ADMIN — ATORVASTATIN CALCIUM 40 MG: 40 TABLET, FILM COATED ORAL at 08:07

## 2025-07-09 RX ADMIN — SODIUM CHLORIDE: 0.9 INJECTION, SOLUTION INTRAVENOUS at 02:07

## 2025-07-09 RX ADMIN — PANTOPRAZOLE SODIUM 40 MG: 40 TABLET, DELAYED RELEASE ORAL at 08:07

## 2025-07-09 RX ADMIN — PROPOFOL 200 MG: 10 INJECTION, EMULSION INTRAVENOUS at 02:07

## 2025-07-09 NOTE — PT/OT/SLP PROGRESS
"Occupational Therapy   Treatment    Name: Yobani Walls  MRN: [id number]  Admitting Diagnosis:  Embolic stroke involving right carotid artery       Recommendations:     Discharge Recommendations: Low Intensity Therapy  Discharge Equipment Recommendations:  none  Barriers to discharge:  None    Assessment:     Yobani Walls is a 50 y.o. male with a medical diagnosis of Embolic stroke involving right carotid artery.  He presents with performance deficits affecting function are impaired functional mobility, impaired endurance, impaired balance.     Rehab Prognosis:  Good; patient would benefit from acute skilled OT services to address these deficits and reach maximum level of function.       Plan:     Patient to be seen 3 x/week to address the above listed problems via self-care/home management, therapeutic exercises, neuromuscular re-education, therapeutic activities  Plan of Care Expires: 08/04/25  Plan of Care Reviewed with: patient    Subjective     Patient:  "I hope to go home today."  Pain/Comfort:  Pain Rating 1: 0/10  Pain Rating Post-Intervention 1: 0/10    Objective:     Communicated with: Nurse prior to session.  Patient found supine with bed alarm, telemetry upon OT entry to room.    General Precautions: Standard, aspiration, fall    Orthopedic Precautions:N/A  Braces: N/A  Respiratory Status: Room air     Occupational Performance:     Bed Mobility:    Patient completed Rolling/Turning to Left with  modified independence  Patient completed Rolling/Turning to Right with modified independence  Patient completed Supine to Sit with modified independence  Patient completed Sit to Supine with modified independence     Functional Mobility/Transfers:  Patient completed Sit <> Stand Transfer with modified independence  with  no assistive device   Patient completed Toilet Transfer Stand Pivot technique with supervision with  no AD    Activities of Daily Living:  Grooming: modified independence    Upper Body Dressing: " modified independence    Lower Body Dressing: supervision    Toileting: supervision      [facility] 6 Click ADL: 18    Treatment & Education:  Patient alert and oriented x 3; able to follow 4/4 one step commands.  Patient attentive and interactive throughout the session.    Patient left supine with all lines intact, call button in reach, and bed alarm on    GOALS:   Multidisciplinary Problems       Occupational Therapy Goals          Problem: Occupational Therapy    Goal Priority Disciplines Outcome Interventions   Occupational Therapy Goal     OT, PT/OT Progressing    Description: Goals set 7/7 with an expiration date, 8/4:  Patient will increase functional independence with ADLs by performing:  Patient will demonstrate supine -sit with modified independence.   Not met  Patient will demonstrate stand pivot transfers with modified independence.   Not met  Patient will demonstrate grooming while standing with modified independence.   Not met  Patient will demonstrate upper body dressing with modified independence while seated EOB.   Not met  Patient will demonstrate lower body dressing with modified independence while seated EOB.   Not met  Patient will demonstrate toileting with modified independence.   Not met  Patient will demonstrate bathing while seated EOB with modified independence.   Not met  Patient's family / caregiver will demonstrate independence and safety with assisting patient with self-care skills and functional mobility.     Not met  Patient and/or patient's family will verbalize understanding of stroke prevention guidelines, personal risk factors and stroke warning signs via teachback method.  Not met                                Time Tracking:     OT Date of Treatment: 07/09/25  OT Start Time: 0715  OT Stop Time: 0726  OT Total Time (min): 11 min    Billable Minutes:Self Care/Home Management 11    OT/HELEN: OT          7/9/2025

## 2025-07-09 NOTE — ANESTHESIA PREPROCEDURE EVALUATION
"                                                                                                             [facility]  Anesthesia Pre-Operative Evaluation     Patient Name: Yobani Walls  YOB: 1975  MRN: [id number]  [facility]: [id number]      Code Status: Full Code   Date of Procedure:   Anesthesia: * No surgery found * Procedure: * No procedures listed *  Pre-Operative Diagnosis: * No surgery found *  Proceduralist: * Surgery not found * * Surgery not found *        SUBJECTIVE:     Pre-operative evaluation for * No surgery found *     07/09/2025    Yobani Walls is a 50 y.o. male with HTN, CAD, Previous NSTEMI s/p PCI,  of RCA, HFrEF EF 35-40%, CKD, Previous Embolic stroke involving R Carotid Artery. Of note, pt is also  a Jehovah Witness.    Yobani Walls is a 50 y.o. male with PMH of HTN, HLD,  multiple MI, CHF, CKD, prior stroke (with no residual deficits) that presents as a transfer from [facility] with R intracranial ICA and R M2 occlusion. LKW 9:00pm 07/05/2026. Patient presented to [facility] this morning with left- sided weakness and bizarre behavior. Wife states that patient "seemed drunk" last night, but this morning had difficulty with driving. Patient was evaluated via telestroke. NIH at that time was 3 for left sided drift and L neglect. CTH/CTA revealed occlusion of the distal intracranial ICA with M1 and QUETA being patent and subsequent reocclusion of the R  inferior M2 segment.  as well as established infarcts in the caudate head and foci in the temporal lobe. Patient was OOW for TNK, IR not recommended due to low NIHSS. Decision made to transfer patient to [facility] [place] and close monitoring for decompensation. On arrival to floor at [facility], stroke team notified. On exam, patient has significant LUE drift, L facial droop, and dysarthria. NIHSS now 6. Patient stood up to go to the bathroom, nurse noted that at this time, patient nearly slumped to the ground due to severe LSW. " STAT MRI, ASA load, and HOB Flat ordered. Patient has a defibrillator, device required interrogation prior to MRI. .     Patient now presents for the above procedure(s).       Anticoagulants   Medication Route Frequency        ________________________________________  Results for orders placed during the hospital encounter of 07/06/25    Echo    Interpretation Summary    Left Ventricle: The left ventricle is normal in size. Normal wall thickness. There is eccentric hypertrophy. Mild global hypokinesis and regional wall motion abnormalities present. See diagram for wall motion findings. There is moderately reduced systolic function with a visually estimated ejection fraction of 35 - 40%. Ejection fraction is approximately 38%. Grade II diastolic dysfunction.    Right Ventricle: The right ventricle is normal in size measuring 2.8 cm. Wall thickness is normal. Systolic function is normal. Pacemaker lead present in the ventricle.    Left Atrium: The left atrium is moderately dilated    Right Atrium: Lead present in the right atrium.    Aortic Valve: The aortic valve is a trileaflet valve. There is mild aortic valve sclerosis. There is mild aortic regurgitation.    Pulmonary Artery: The estimated pulmonary artery systolic pressure is 28 mmHg.    IVC/SVC: Normal venous pressure at 3 mmHg.    ________________________________________    Prev airway: None documented.            LDA:   Peripheral IV Single Lumen 07/06/25 2015 20 G Anterior;Right Forearm (Active)   Site Assessment Clean;Dry;Intact 07/07/25 0400   Line Securement Device Antimicrobial Adhesive 07/06/25 2125   Extremity Assessment Distal to IV No abnormal discoloration 07/06/25 2125   Line Status Infusing 07/07/25 0400   Dressing Status Clean;Dry;Intact 07/06/25 2125   Dressing Intervention First dressing 07/06/25 2125   Dressing Change Due 07/10/25 07/06/25 2125   Site Change Due 07/10/25 07/06/25 2125   Reason Not Rotated Not due 07/06/25 2125   Number of days:  0       Drips:       Problem List[1]    Review of patient's allergies indicates:  No Known Allergies    Current Inpatient Medications:    aspirin  81 mg Oral Daily    atorvastatin  40 mg Oral Daily    clopidogreL  75 mg Oral Daily    mupirocin   Nasal BID    pantoprazole  40 mg Oral Daily       Medications Ordered Prior to Encounter[2]    No past surgical history on file.    Social History:  Tobacco Use: Medium Risk (5/8/2025)    Received from [facility]    Patient History     Smoking Tobacco Use: Former     Smokeless Tobacco Use: Never     Passive Exposure: Not on file       Alcohol Use: Not on file       OBJECTIVE:     Vital Signs Range:  BMI Readings from Last 1 Encounters:   07/07/25 24.63 kg/m²       Temp:  [36.4 °C (97.5 °F)-37.2 °C (98.9 °F)]   Pulse:  [46-54]   Resp:  [15-18]   BP: (159-175)/()   SpO2:  [97 %-100 %]        Significant Labs:        Component Value Date/Time    WBC 5.66 07/09/2025 0323    HGB 14.6 07/09/2025 0323    HGB 16.1 07/06/2025 1251    HCT 43.2 07/09/2025 0323    HCT 47.8 07/06/2025 1251     07/09/2025 0323     07/06/2025 1251     07/09/2025 0323     07/06/2025 1251    K 3.8 07/09/2025 0323    K 4.1 07/06/2025 1251     07/09/2025 0323     07/06/2025 1251    CO2 23 07/09/2025 0323    CO2 28 07/06/2025 1251    GLU 80 07/09/2025 0323     (H) 07/06/2025 1251    BUN 20 07/09/2025 0323    CREATININE 1.8 (H) 07/09/2025 0323    MG 1.9 07/07/2025 0532    PHOS 2.4 (L) 07/07/2025 0532    CALCIUM 8.9 07/09/2025 0323    CALCIUM 9.5 07/06/2025 1251    ALBUMIN 3.5 07/09/2025 0323    ALBUMIN 4.3 07/06/2025 1251    PROT 5.9 (L) 07/09/2025 0323    PROT 7.0 07/06/2025 1251    ALKPHOS 36 (L) 07/09/2025 0323    ALKPHOS 47 07/06/2025 1251    BILITOT 1.1 (H) 07/09/2025 0323    BILITOT 1.0 07/06/2025 1251    AST 14 07/09/2025 0323    AST 22 07/06/2025 1251    ALT 7 (L) 07/09/2025 0323    ALT 11 07/06/2025 1251    INR 1.0 07/07/2025 0532    INR 1.0  07/06/2025 1251    HGBA1C 5.3 07/06/2025 1632        Please see Results Review for additional labs.     Diagnostic Studies: No relevant studies.    EKG:   Results for orders placed or performed during the hospital encounter of 07/06/25   EKG, 12 - Lead    Collection Time: 07/06/25  4:52 PM   Result Value Ref Range    QRS Duration 98 ms    OHS QTC Calculation 423 ms    Narrative    Test Reason : I63.9,    Vent. Rate :  50 BPM     Atrial Rate :  50 BPM     P-R Int : 174 ms          QRS Dur :  98 ms      QT Int : 464 ms       P-R-T Axes :  69  56 220 degrees    QTcB Int : 423 ms    Sinus bradycardia  Incomplete left bundle branch block  Abnormal ECG  When compared with ECG of 06-Jul-2025 13:18,  Axis is now normal  Confirmed by Siva Dong ([id number]) on 7/7/2025 8:24:01 AM    Referred By: JODI ERAZO           Confirmed By: Siva Dong       ECHO:  See subjective, if available.      ASSESSMENT/PLAN:          Pre-op Assessment    I have reviewed the Patient Summary Reports.     I have reviewed the Nursing Notes. I have reviewed the NPO Status.   I have reviewed the Medications.     Review of Systems  Anesthesia Hx:  No problems with previous Anesthesia             Denies Family Hx of Anesthesia complications.    Denies Personal Hx of Anesthesia complications.                    Hematology/Oncology:       -- Denies Anemia:                                  Cardiovascular:     Denies Pacemaker. Hypertension  Past MI CAD   CABG/stent    CHF                Congestive Heart Failure (CHF)                Hypertension         Pulmonary:    Denies COPD.  Denies Asthma.                    Renal/:  Chronic Renal Disease, CKD        Kidney Function/Disease             Hepatic/GI:      Denies GERD.    Not Taking GLP-1 Agonists            Neurological:   CVA    Denies Seizures.                   CVA - Cerebrovasular Accident                 Endocrine:  Denies Diabetes.         Denies Obesity / BMI > 30, Denies Morbid Obesity /  BMI > 40      Physical Exam  General: Well nourished, Cooperative, Alert and Oriented    Airway:  Mallampati: III / II  Mouth Opening: Normal  TM Distance: Normal  Tongue: Normal  Neck ROM: Normal ROM    Dental:  Intact    Chest/Lungs:  Normal Respiratory Rate    Heart:  Rate: Normal        Anesthesia Plan  Type of Anesthesia, risks & benefits discussed:    Anesthesia Type: Gen Natural Airway, MAC, Gen ETT  Intra-op Monitoring Plan: Standard ASA Monitors  Post Op Pain Control Plan: multimodal analgesia and IV/PO Opioids PRN  Induction:  IV  Informed Consent: Informed consent signed with the Patient and all parties understand the risks and agree with anesthesia plan.  All questions answered.   ASA Score: 4  Day of Surgery Review of History & Physical: H&P Update referred to the surgeon/provider.  Anesthesia Plan Notes: Spoke w/ mirza in device clinic - Place magnet over defib for duration of procedure.    Ready For Surgery From Anesthesia Perspective.     .             [1]   Patient Active Problem List  Diagnosis    Embolic stroke involving right carotid artery    CHF (congestive heart failure)    Hyperlipidemia    History of MI (myocardial infarction)    History of muscle spasm    Hypertension    CKD (chronic kidney disease)    Hypokalemia   [2]   No current facility-administered medications on file prior to encounter.     Current Outpatient Medications on File Prior to Encounter   Medication Sig Dispense Refill    aspirin (ECOTRIN) 81 MG EC tablet Take 81 mg by mouth once daily.      carvediloL (COREG) 25 MG tablet Take 25 mg by mouth once daily.      cholecalciferol, vitamin D3, 125 mcg (5,000 unit) Tab Take 2 tablets by mouth once daily.      clopidogreL (PLAVIX) 75 mg tablet Take 75 mg by mouth.      dapagliflozin propanediol (FARXIGA) 10 mg tablet Take 10 mg by mouth.      evolocumab (REPATHA SYRINGE) 140 mg/mL Syrg Inject 140 mg into the skin every 14 (fourteen) days.      fenofibrate (TRICOR) 145 MG tablet  Take 145 mg by mouth once daily.      furosemide (LASIX) 40 MG tablet Take 40 mg by mouth once daily.      hydrALAZINE (APRESOLINE) 50 MG tablet Take 50 mg by mouth.      potassium chloride (KLOR-CON) 10 MEQ TbSR Take 10 mEq by mouth every morning.      rosuvastatin (CRESTOR) 40 MG Tab Take 40 mg by mouth once daily.      sacubitriL-valsartan (ENTRESTO)  mg per tablet Take 1 tablet by mouth.      amLODIPine (NORVASC) 10 MG tablet Take 10 mg by mouth once daily.

## 2025-07-09 NOTE — ASSESSMENT & PLAN NOTE
"Yobani Walls is a 50 y.o. male with PMH of HTN, HLD,  multiple MI, CHF, CKD, prior stroke (with no residual deficits) that presents as a transfer from [facility] with R intracranial ICA and R M2 occlusion. LKW 9:00pm 07/05/2026. Patient presented to [facility] this morning with left- sided weakness and bizarre behavior. Wife states that patient "seemed drunk" last night, but this morning had difficulty with driving. Patient was evaluated via telestroke. NIH at that time was 3 for left sided drift and L neglect. CTH/CTA revealed occlusion of the distal intracranial ICA with M1 and QUETA being patent and subsequent reocclusion of the R  inferior M2 segment.  as well as established infarcts in the caudate head and foci in the temporal lobe. Patient was OOW for TNK, IR not recommended due to low NIHSS. Decision made to transfer patient to [facility] for MRI and close monitoring for decompensation. On arrival to floor at [facility], stroke team notified. On exam, patient has significant LUE drift, L facial droop, and dysarthria. NIHSS now 6. Patient stood up to go to the bathroom, nurse noted that at this time, patient nearly slumped to the ground due to severe LSW. STAT MRI, ASA load, and HOB Flat ordered. Patient has a defibrillator, device required interrogation prior to MRI.     Discussed worsening exam with VN attending. Will obtain MRI prior to making decision on thrombectomy. Delay in obtaining MRI due to need for device interrogation. Interrogation complete and device switched to MRI mode by cardiology fellow, however, MRI staff requested device information. Device information included in a care update by cardiology fellow, however, MRI staff then stated protocol requires device to be programmed in MRI, not on the floor. This caused further delay in getting patient to MRI. Cardiology fellow and radiology on call notified to expedite MRI process.     Etiology unclear at this time given fluctuating presentation with imaging " showing areas of dilatation - will compare with imaging from prior TIA at OSH.     7/9/2025 GENA. Neuro exam with L lower facial palsy and mild dysarthria. No hemiparesis. Diagnostic angio delayed to today with subsequent discharge home.    Antithrombotics for secondary stroke prevention: Antiplatelets: Aspirin: 81 mg daily  Clopidogrel: 75 mg daily    Statins for secondary stroke prevention and hyperlipidemia, if present:   Statins: Atorvastatin- 40 mg daily    Aggressive risk factor modification: HTN, HLD, Diet, Exercise     Rehab efforts: The patient has been evaluated by a stroke team provider and the therapy needs have been fully considered based off the presenting complaints and exam findings. The following therapy evaluations are needed: PT evaluate and treat, OT evaluate and treat, SLP evaluate and treat, PM&R evaluate for appropriate placement    Diagnostics ordered/pending: Diagnostic angio pending    VTE prophylaxis: Heparin 5000 units SQ every 8 hours  Mechanical prophylaxis: Place SCDs    BP parameters: Infarct: No intervention, SBP <220

## 2025-07-09 NOTE — PLAN OF CARE
Patient arrived to MPU room 5 for pre-op prior to cerebral angiogram. No pain or distress noted. VSS, afebrile. Patient placed on continuous cardiac and pulse ox monitoring. Bed at lowest position, side rails x2, call light within reach. Pre-procedure complete. Procedure RN notified.

## 2025-07-09 NOTE — PLAN OF CARE
Procedure completed. Patient tolerated well; VSS. Hemostasis achieved to right groin via 5 Fr Vascade and manual pressure at 1620; patient to remain flat until 2020. Right groin site CDI; prior hematoma area outlined but site is soft; will continue to monitor. Patient to be transported to PACU accompanied by this RN and CRNA; report to be given at the bedside.

## 2025-07-09 NOTE — PROGRESS NOTES
Pt w/ an AICD going for carotid angio in IR\  Single chamber, no pacing noted on prior EKG's  Recommended magnet placement over device to suspend tachy therapy

## 2025-07-09 NOTE — ANESTHESIA PROCEDURE NOTES
Intubation    Date/Time: 7/9/2025 2:38 PM    Performed by: Amarilys Santana CRNA  Authorized by: Leopold, Rhonda G, MD    Intubation:     Induction:  Intravenous    Intubated:  Postinduction    Mask Ventilation:  Easy mask    Attempts:  1    Attempted By:  CRNA    Method of Intubation:  Video laryngoscopy    Blade:  Cochran 3    Laryngeal View Grade: Grade I - full view of cords      Difficult Airway Encountered?: No      Complications:  None    Airway Device:  Oral endotracheal tube    Airway Device Size:  7.5    Style/Cuff Inflation:  Cuffed (inflated to minimal occlusive pressure)    Tube secured:  23    Secured at:  The teeth    Placement Verified By:  Capnometry    Complicating Factors:  None    Findings Post-Intubation:  BS equal bilateral and atraumatic/condition of teeth unchanged

## 2025-07-09 NOTE — PLAN OF CARE
Problem: Adult Inpatient Plan of Care  Goal: Plan of Care Review  Outcome: Progressing  Goal: Patient-Specific Goal (Individualized)  Outcome: Progressing  Goal: Absence of Hospital-Acquired Illness or Injury  Outcome: Progressing     Problem: Stroke, Ischemic (Includes Transient Ischemic Attack)  Goal: Optimal Coping  Outcome: Progressing  Goal: Effective Bowel Elimination  Outcome: Progressing  Goal: Optimal Cerebral Tissue Perfusion  Outcome: Progressing  Plan of care reviewed with pt and family. Pt voiced understanding. Pt AAOX3. No c/o during the night. No apparent distress noted. Pt NPO for angiogram. Bed in lowest position and locked, call light within reach, side rails X2, and slip resistant socks on at this time. Tele orders maintained. Call light in reach.

## 2025-07-09 NOTE — PROCEDURES
Interventional Neuroradiology Post-Procedure Note    Pre Op Diagnosis: Right ICA occlusion    Post Op Diagnosis: Right supra-clinoid ICA occlusion    Procedure: Diagnostic cerebral angiogram    Procedure performed by: Anabelle MORELOS, Bobby; Yovani MORELOS, Terry; Reymundo MORELOS, Mansoor; Eunice MORELOS; Amy    Written Informed Consent Obtained: Yes    Specimen Removed: NO    Estimated Blood Loss: Minimal    Procedure report:   A 5F sheath was placed into the right femoral artery and a 5F Garrett catheter was advanced into the bovine aortic arch. Bilateral CCA/ICA/ ECA and left vertebral arteries were subselected and angiography of the brain was performed after injection into each of these vessels.    Preliminary interpretation:       Right supra-clinoid ICA occlusion with no EC-IC collateral anastomosis through Ophthalmic.    2. Delayed Right QUETA/MCA is filled via [facility].         Please see Imaging report for full details.    A right femoral artery angiogram was performed, the sheath removed and hemostasis achieved using 5F Vascade device.  Small hematoma was present at the time of hemostasis and additional manual pressure was held at groin site to ensure hemostasis.     The patient tolerated the procedure well.     Plan:  -To recovery for monitoring  -Bed rest for  4 h  -Groin check and pulse check q2h   -Avoid carrying heavy weights > 10 lbs x 24 hrs   -Remove groin dressing tomorrow       Mansoor Dwyer MD  Fellow, NeuroEndovascular Surgery, [facility]

## 2025-07-09 NOTE — PT/OT/SLP PROGRESS
"Speech Language Pathology Treatment    Patient Name:  Yobani Walls   MRN:  [id number]  Admitting Diagnosis: Embolic stroke involving right carotid artery    Recommendations:                 General Recommendations:  Follow-up not indicated  Diet recommendations:  Regular, Liquid Diet Level: Thin   Aspiration Precautions: Meds whole 1 at a time and Standard aspiration precautions   General Precautions: Standard, fall  Communication strategies:  none  Discharge recommendations:  No Therapy Indicated   Barriers to Discharge:  None    Assessment:     Yobani Walls is a 50 y.o. male with speech language and cognitive skills at baseline  Subjective     "That's all good" per pt re cognition  Patient goals: home     Pain/Comfort:  Pain Rating 1: 0/10  Pain Rating Post-Intervention 1: 0/10    Respiratory Status: Room air    Objective:     Has the patient been evaluated by SLP for swallowing?   Yes  Keep patient NPO? No   Current Respiratory Status:        Pt. Seen at bedside with good attention to task.  He was oriented x3 with good recall of recent events.  SPeech was 100% intelligible in conversation.  Pt. And sig other reported that cognition is at baseline.  Mr. Walls orally read sentences at  midline with 100% accuracy and accurately wrote name and address and oscar graphic representation of a clock accurately.  Pt. And family denied swallowing difficulty.        Goals:   Multidisciplinary Problems       SLP Goals          Problem: SLP    Goal Priority Disciplines Outcome   SLP Goal     SLP Progressing   Description: Goals due 7/17  1.  Tolerate regular diet with thin liquids with no s/s of aspiration  2.  Assess functional reading ,writing, visual spatial skills  3.  Participate in ongoing assessment of higher cognitive skills                       Plan:     Patient to be seen:  4 x/week   Plan of Care expires:  08/04/25  Plan of Care reviewed with:  patient   SLP Follow-Up:  No       Time Tracking:     SLP Treatment Date:   " 07/09/25  Speech Start Time:  1035  Speech Stop Time:  1047     Speech Total Time (min):  12 min    Billable Minutes: Speech Therapy Individual 12    07/09/2025

## 2025-07-09 NOTE — PT/OT/SLP PROGRESS
Physical Therapy      Patient Name:  Yobani Walls   MRN:  [id number]    Patient not seen today secondary to Off the floor for procedure/surgery. Cerebral angiogram. Will follow-up next scheduled visit date.

## 2025-07-09 NOTE — SUBJECTIVE & OBJECTIVE
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    HPI, Past Medical, Family, and Social History remains the same as documented in the initial encounter.     Review of Systems   Neurological:  Positive for facial asymmetry, speech difficulty and weakness. Negative for headaches.     Scheduled Meds:   aspirin  81 mg Oral Daily    atorvastatin  40 mg Oral Daily    clopidogreL  75 mg Oral Daily    mupirocin   Nasal BID    pantoprazole  40 mg Oral Daily     PRN Meds:  Current Facility-Administered Medications:     baclofen, 5 mg, Oral, TID PRN    bisacodyL, 10 mg, Rectal, Daily PRN    hydrALAZINE, 10 mg, Intravenous, Q6H PRN    ondansetron, 4 mg, Intravenous, Q12H PRN    sodium chloride 0.9%, 500 mL, Intravenous, PRN    sodium chloride 0.9%, 10 mL, Intravenous, PRN    Objective:     Vital Signs (Most Recent):  Temp: 98.3 °F (36.8 °C) (07/09/25 1152)  Pulse: (!) 50 (07/09/25 1152)  Resp: 18 (07/09/25 1152)  BP: (!) 159/90 (07/09/25 1152)  SpO2: 97 % (07/09/25 1152)  BP Location: Left arm    Vital Signs Range (Last 24H):  Temp:  [97.7 °F (36.5 °C)-98.9 °F (37.2 °C)]   Pulse:  [45-55]   Resp:  [18]   BP: (141-175)/()   SpO2:  [97 %-100 %]   BP Location: Left arm       Physical Exam  Vitals and nursing note reviewed.   HENT:      Head: Normocephalic.   Eyes:      Pupils: Pupils are equal, round, and reactive to light.   Pulmonary:      Effort: Pulmonary effort is normal. No respiratory distress.   Skin:     General: Skin is warm and dry.   Neurological:      Mental Status: He is alert and oriented to person, place, and time.          Neurological Exam:   LOC: alert  Attention Span: Good   Language: No aphasia  Articulation: Dysarthria  Orientation: Person, Place, Time   Visual Fields: Full  EOM (CN III, IV, VI): Full/intact  Pupils (CN II, III):  PERRL  Facial Movement (CN VII): Lower facial weakness on the Left  Motor: Arm left  Normal 5/5  Leg left  Normal 5/5  Arm right  Normal 5/5  Leg right Normal 5/5  Cerebellum: No evidence of appendicular or axial ataxia  Sensation: intact to light touch    Laboratory:  CMP:   Recent Labs   Lab 07/09/25  0323   CALCIUM 8.9   ALBUMIN 3.5   PROT 5.9*      K 3.8   CO2 23      BUN 20   CREATININE 1.8*   ALKPHOS 36*   ALT 7*   AST 14   BILITOT 1.1*     BMP:   Recent Labs   Lab 07/09/25  0323      K 3.8      CO2 23   BUN 20   CREATININE 1.8*   CALCIUM 8.9     CBC:   Recent Labs   Lab 07/09/25  0323   WBC 5.66   RBC 5.12   HGB 14.6   HCT 43.2      MCV 84   MCH 28.5   MCHC 33.8     Lipid Panel:   Recent Labs   Lab 07/06/25  1630   CHOL 222*   LDLCALC 136.0   HDL 50   TRIG 180*     Coagulation:   Recent Labs   Lab 07/07/25  0532   INR 1.0   APTT 27.3     Hgb A1C:   Recent Labs   Lab 07/06/25  1632   HGBA1C 5.3     TSH:   Recent Labs   Lab 07/06/25  1630   TSH 1.861       Diagnostic Results     Brain Imaging   MRI Brain w/o  - Acute infarction of the right cerebral hemisphere involving the right caudate head, right amygdala, right subinsular cortex, and portions of the right frontal cortex.  - No evidence of hemorrhagic conversion, mass effect, or midline shift.    CT Head 07/06/2025  Acute/subacute infarct right head of the caudate nucleus.     Vessel Imaging  Diagnostic angiography 7/9/2025  Pending    CTA Brain 07/06/2025  1. Occluded distal right ICA.  2. Severe stenosis right and left M2 segments.  3. Subjectively decreased enhancement the right sylvian branches.     CTA Neck 07/06/2025  Tapered narrowing of the mid/distal ICA resulting in complete occlusion.    Cardiac Imaging   Echo     Left Ventricle: The left ventricle is normal in size. Normal wall thickness. There is eccentric hypertrophy. Mild global hypokinesis and regional wall motion abnormalities present. See diagram for wall  motion findings. There is moderately reduced systolic function with a visually estimated ejection fraction of 35 - 40%. Ejection fraction is approximately 38%. Grade II diastolic dysfunction.    Right Ventricle: The right ventricle is normal in size measuring 2.8 cm. Wall thickness is normal. Systolic function is normal. Pacemaker lead present in the ventricle.    Left Atrium: The left atrium is moderately dilated    Right Atrium: Lead present in the right atrium.    Aortic Valve: The aortic valve is a trileaflet valve. There is mild aortic valve sclerosis. There is mild aortic regurgitation.    Pulmonary Artery: The estimated pulmonary artery systolic pressure is 28 mmHg.    IVC/SVC: Normal venous pressure at 3 mmHg.

## 2025-07-09 NOTE — PLAN OF CARE
Pt arrived to IR Room 4 for cerebral angiogram with anesthesia. Pt oriented to unit and staff. Plan of care reviewed with patient, patient verbalizes understanding. Comfort measures utilized. Pt safely transferred from stretcher to procedural table. Fall risk reviewed with patient, fall risk interventions maintained. Safety strap applied, positioner pillows utilized to minimize pressure points. Blankets applied. Pt prepped and draped utilizing standard sterile technique. Patient placed on continuous monitoring, as required by sedation policy. Timeouts completed utilizing standard universal time-out, per department and facility policy. CRNA to remain at bedside, continuous monitoring maintained. Pt resting comfortably. Denies pain/discomfort. Will continue to monitor. See anesthesia flow sheets for monitoring, medication administration, and updates.       Limited blood refusal form completed and signed by patient/anesthesia prior to intubation.

## 2025-07-09 NOTE — PROGRESS NOTES
"[facility] - Neurosurgery ([facility])  Vascular Neurology  Comprehensive Stroke Center  Progress Note    Assessment/Plan:     * Embolic stroke involving right carotid artery  Yobani Walls is a 50 y.o. male with PMH of HTN, HLD,  multiple MI, CHF, CKD, prior stroke (with no residual deficits) that presents as a transfer from [facility] with R intracranial ICA and R M2 occlusion. LKW 9:00pm 07/05/2026. Patient presented to [facility] this morning with left- sided weakness and bizarre behavior. Wife states that patient "seemed drunk" last night, but this morning had difficulty with driving. Patient was evaluated via telestroke. NIH at that time was 3 for left sided drift and L neglect. CTH/CTA revealed occlusion of the distal intracranial ICA with M1 and QUETA being patent and subsequent reocclusion of the R  inferior M2 segment.  as well as established infarcts in the caudate head and foci in the temporal lobe. Patient was OOW for TNK, IR not recommended due to low NIHSS. Decision made to transfer patient to [facility] for MRI and close monitoring for decompensation. On arrival to floor at [facility], stroke team notified. On exam, patient has significant LUE drift, L facial droop, and dysarthria. NIHSS now 6. Patient stood up to go to the bathroom, nurse noted that at this time, patient nearly slumped to the ground due to severe LSW. STAT MRI, ASA load, and HOB Flat ordered. Patient has a defibrillator, device required interrogation prior to MRI.     Discussed worsening exam with VN attending. Will obtain MRI prior to making decision on thrombectomy. Delay in obtaining MRI due to need for device interrogation. Interrogation complete and device switched to MRI mode by cardiology fellow, however, MRI staff requested device information. Device information included in a care update by cardiology fellow, however, MRI staff then stated protocol requires device to be programmed in MRI, not on the floor. This caused further delay in " getting patient to MRI. Cardiology fellow and radiology on call notified to expedite MRI process.     Etiology unclear at this time given fluctuating presentation with imaging showing areas of dilatation - will compare with imaging from prior TIA at OSH.     7/9/2025 GENA. Neuro exam with L lower facial palsy and mild dysarthria. No hemiparesis. Diagnostic angio delayed to today with subsequent discharge home.    Antithrombotics for secondary stroke prevention: Antiplatelets: Aspirin: 81 mg daily  Clopidogrel: 75 mg daily    Statins for secondary stroke prevention and hyperlipidemia, if present:   Statins: Atorvastatin- 40 mg daily    Aggressive risk factor modification: HTN, HLD, Diet, Exercise     Rehab efforts: The patient has been evaluated by a stroke team provider and the therapy needs have been fully considered based off the presenting complaints and exam findings. The following therapy evaluations are needed: PT evaluate and treat, OT evaluate and treat, SLP evaluate and treat, PM&R evaluate for appropriate placement    Diagnostics ordered/pending: Diagnostic angio pending    VTE prophylaxis: Heparin 5000 units SQ every 8 hours  Mechanical prophylaxis: Place SCDs    BP parameters: Infarct: No intervention, SBP <220    CKD (chronic kidney disease)  History of chronic kidney disease noted.     Plan:   - avoid nephrotoxic medications   - renally dose medications   - monitor with daily labs     Hypertension  Patient is on the following at home per chart review from care everywhere: amlodipine 10 mg qd, carvedilol 25 mg qd, lasix 40 mg qd, hydralazine 50 mg unspecified number of times per day.     Plan:   - hold hypertension medications in setting of permissive hypertension, will add back as clinically indicated.     Hyperlipidemia  Patient is on the following at home per care everywhere chart review: rosuvastatin 40 mg qd, fenofibrate 145 mg unspecified number of times per day.     Plan:   - will substitute  rosuvastatin for atorvastatin   - continue fenofibrate    CHF (congestive heart failure)  Patient has extensive history of congestive heart failure. Per chart review from care everywhere, he is on the following regimen at home: carvedilol 25 mg qd, entresto  mg, farxiga 10 mg qd, lasix 40 mg qd.     Plan:   - will hold GDMT initially in the setting of permissive hypertension. Will add back as clinically indicated.          7/7/2025 Stable neurological exam - NIHSS 6 with left facial palsy, LUE/LLE weakness, and mild dysarthria. Plan for diagnostic angiography to evaluate R MCA fusiform pattern seen on vessel imaging.  7/8/2025 Improved left hemiparesis particularly in the LUE. Slightly improved L facial palsy with mild dysarthria. Plan for diagnostic angio today, otherwise medically appropriate for discharge. Pt prefers home with outpatient therapy.  7/9/2025 NAEON. Neuro exam with L lower facial palsy and mild dysarthria. No hemiparesis. Diagnostic angio delayed to today with subsequent discharge home.    STROKE DOCUMENTATION        NIH Scale:  1a. Level of Consciousness: 0-->Alert, keenly responsive  1b. LOC Questions: 0-->Answers both questions correctly  1c. LOC Commands: 0-->Performs both tasks correctly  2. Best Gaze: 0-->Normal  3. Visual: 0-->No visual loss  4. Facial Palsy: 1-->Minor paralysis (flattened nasolabial fold, asymmetry on smiling)  5a. Motor Arm, Left: 0-->No drift, limb holds 90 (or 45) degrees for full 10 secs  5b. Motor Arm, Right: 0-->No drift, limb holds 90 (or 45) degrees for full 10 secs  6a. Motor Leg, Left: 0-->No drift, leg holds 30 degree position for full 5 secs  6b. Motor Leg, Right: 0-->No drift, leg holds 30 degree position for full 5 secs  7. Limb Ataxia: 0-->Absent  8. Sensory: 0-->Normal, no sensory loss  9. Best Language: 0-->No aphasia, normal  10. Dysarthria: 1-->Mild-to-moderate dysarthria, patient slurs at least some words and, at worst, can be understood with some  difficulty  11. Extinction and Inattention (formerly Neglect): 0-->No abnormality  Total (NIH Stroke Scale): 2       Modified [place] Score: 0  [place] Coma Scale:    ABCD2 Score:    [id number] Score:   HAS -BLED Score:   ICH Score:   Hunt & Hackett Classification:      Hemorrhagic change of an Ischemic Stroke: Does this patient have an ischemic stroke with hemorrhagic changes? No     Neurologic Chief Complaint: R carotid ICA embolism of unclear etiology    Subjective:     Interval History: Patient is seen for follow-up neurological assessment and treatment recommendations: See hospital course.    HPI, Past Medical, Family, and Social History remains the same as documented in the initial encounter.     Review of Systems   Neurological:  Positive for facial asymmetry, speech difficulty and weakness. Negative for headaches.     Scheduled Meds:   aspirin  81 mg Oral Daily    atorvastatin  40 mg Oral Daily    clopidogreL  75 mg Oral Daily    mupirocin   Nasal BID    pantoprazole  40 mg Oral Daily     PRN Meds:  Current Facility-Administered Medications:     baclofen, 5 mg, Oral, TID PRN    bisacodyL, 10 mg, Rectal, Daily PRN    hydrALAZINE, 10 mg, Intravenous, Q6H PRN    ondansetron, 4 mg, Intravenous, Q12H PRN    sodium chloride 0.9%, 500 mL, Intravenous, PRN    sodium chloride 0.9%, 10 mL, Intravenous, PRN    Objective:     Vital Signs (Most Recent):  Temp: 98.3 °F (36.8 °C) (07/09/25 1152)  Pulse: (!) 50 (07/09/25 1152)  Resp: 18 (07/09/25 1152)  BP: (!) 159/90 (07/09/25 1152)  SpO2: 97 % (07/09/25 1152)  BP Location: Left arm    Vital Signs Range (Last 24H):  Temp:  [97.7 °F (36.5 °C)-98.9 °F (37.2 °C)]   Pulse:  [45-55]   Resp:  [18]   BP: (141-175)/()   SpO2:  [97 %-100 %]   BP Location: Left arm       Physical Exam  Vitals and nursing note reviewed.   HENT:      Head: Normocephalic.   Eyes:      Pupils: Pupils are equal, round, and reactive to light.   Pulmonary:      Effort: Pulmonary effort is normal. No  respiratory distress.   Skin:     General: Skin is warm and dry.   Neurological:      Mental Status: He is alert and oriented to person, place, and time.          Neurological Exam:   LOC: alert  Attention Span: Good   Language: No aphasia  Articulation: Dysarthria  Orientation: Person, Place, Time   Visual Fields: Full  EOM (CN III, IV, VI): Full/intact  Pupils (CN II, III): PERRL  Facial Movement (CN VII): Lower facial weakness on the Left  Motor: Arm left  Normal 5/5  Leg left  Normal 5/5  Arm right  Normal 5/5  Leg right Normal 5/5  Cerebellum: No evidence of appendicular or axial ataxia  Sensation: intact to light touch    Laboratory:  CMP:   Recent Labs   Lab 07/09/25  0323   CALCIUM 8.9   ALBUMIN 3.5   PROT 5.9*      K 3.8   CO2 23      BUN 20   CREATININE 1.8*   ALKPHOS 36*   ALT 7*   AST 14   BILITOT 1.1*     BMP:   Recent Labs   Lab 07/09/25  0323      K 3.8      CO2 23   BUN 20   CREATININE 1.8*   CALCIUM 8.9     CBC:   Recent Labs   Lab 07/09/25  0323   WBC 5.66   RBC 5.12   HGB 14.6   HCT 43.2      MCV 84   MCH 28.5   MCHC 33.8     Lipid Panel:   Recent Labs   Lab 07/06/25  1630   CHOL 222*   LDLCALC 136.0   HDL 50   TRIG 180*     Coagulation:   Recent Labs   Lab 07/07/25  0532   INR 1.0   APTT 27.3     Hgb A1C:   Recent Labs   Lab 07/06/25  1632   HGBA1C 5.3     TSH:   Recent Labs   Lab 07/06/25  1630   TSH 1.861       Diagnostic Results     Brain Imaging   MRI Brain w/o  - Acute infarction of the right cerebral hemisphere involving the right caudate head, right amygdala, right subinsular cortex, and portions of the right frontal cortex.  - No evidence of hemorrhagic conversion, mass effect, or midline shift.    CT Head 07/06/2025  Acute/subacute infarct right head of the caudate nucleus.     Vessel Imaging  Diagnostic angiography 7/9/2025  Pending    CTA Brain 07/06/2025  1. Occluded distal right ICA.  2. Severe stenosis right and left M2 segments.  3. Subjectively  decreased enhancement the right sylvian branches.     CTA Neck 07/06/2025  Tapered narrowing of the mid/distal ICA resulting in complete occlusion.    Cardiac Imaging   Echo     Left Ventricle: The left ventricle is normal in size. Normal wall thickness. There is eccentric hypertrophy. Mild global hypokinesis and regional wall motion abnormalities present. See diagram for wall motion findings. There is moderately reduced systolic function with a visually estimated ejection fraction of 35 - 40%. Ejection fraction is approximately 38%. Grade II diastolic dysfunction.    Right Ventricle: The right ventricle is normal in size measuring 2.8 cm. Wall thickness is normal. Systolic function is normal. Pacemaker lead present in the ventricle.    Left Atrium: The left atrium is moderately dilated    Right Atrium: Lead present in the right atrium.    Aortic Valve: The aortic valve is a trileaflet valve. There is mild aortic valve sclerosis. There is mild aortic regurgitation.    Pulmonary Artery: The estimated pulmonary artery systolic pressure is 28 mmHg.    IVC/SVC: Normal venous pressure at 3 mmHg.    Godwin Campos MD  Comprehensive Stroke Center  Department of Vascular Neurology   [facility] Neurosurgery [facility])

## 2025-07-10 VITALS
HEART RATE: 66 BPM | TEMPERATURE: 98 F | SYSTOLIC BLOOD PRESSURE: 165 MMHG | OXYGEN SATURATION: 96 % | DIASTOLIC BLOOD PRESSURE: 95 MMHG | RESPIRATION RATE: 18 BRPM | BODY MASS INDEX: 24.66 KG/M2 | HEIGHT: 65 IN | WEIGHT: 148 LBS

## 2025-07-10 PROBLEM — G81.90 HEMIPARESIS: Status: ACTIVE | Noted: 2025-07-10

## 2025-07-10 PROBLEM — I50.22 CHRONIC SYSTOLIC CONGESTIVE HEART FAILURE: Status: ACTIVE | Noted: 2025-07-06

## 2025-07-10 PROBLEM — N18.31 STAGE 3A CHRONIC KIDNEY DISEASE: Status: ACTIVE | Noted: 2025-07-06

## 2025-07-10 PROBLEM — Z74.09 OTHER REDUCED MOBILITY: Status: ACTIVE | Noted: 2025-07-10

## 2025-07-10 LAB
ABSOLUTE EOSINOPHIL (OHS): 0.1 K/UL
ABSOLUTE MONOCYTE (OHS): 0.71 K/UL (ref 0.3–1)
ABSOLUTE NEUTROPHIL COUNT (OHS): 4.52 K/UL (ref 1.8–7.7)
ALBUMIN SERPL BCP-MCNC: 3.4 G/DL (ref 3.5–5.2)
ALP SERPL-CCNC: 43 UNIT/L (ref 40–150)
ALT SERPL W/O P-5'-P-CCNC: 6 UNIT/L (ref 10–44)
ANION GAP (OHS): 7 MMOL/L (ref 8–16)
AST SERPL-CCNC: 16 UNIT/L (ref 11–45)
BASOPHILS # BLD AUTO: 0.05 K/UL
BASOPHILS NFR BLD AUTO: 0.7 %
BILIRUB SERPL-MCNC: 1.1 MG/DL (ref 0.1–1)
BUN SERPL-MCNC: 18 MG/DL (ref 6–20)
CALCIUM SERPL-MCNC: 8.4 MG/DL (ref 8.7–10.5)
CHLORIDE SERPL-SCNC: 110 MMOL/L (ref 95–110)
CO2 SERPL-SCNC: 20 MMOL/L (ref 23–29)
CREAT SERPL-MCNC: 1.8 MG/DL (ref 0.5–1.4)
ERYTHROCYTE [DISTWIDTH] IN BLOOD BY AUTOMATED COUNT: 14.6 % (ref 11.5–14.5)
GFR SERPLBLD CREATININE-BSD FMLA CKD-EPI: 46 ML/MIN/1.73/M2
GLUCOSE SERPL-MCNC: 60 MG/DL (ref 70–110)
HCT VFR BLD AUTO: 42.8 % (ref 40–54)
HGB BLD-MCNC: 14.3 GM/DL (ref 14–18)
IMM GRANULOCYTES # BLD AUTO: 0.02 K/UL (ref 0–0.04)
IMM GRANULOCYTES NFR BLD AUTO: 0.3 % (ref 0–0.5)
LYMPHOCYTES # BLD AUTO: 1.37 K/UL (ref 1–4.8)
MCH RBC QN AUTO: 28.5 PG (ref 27–31)
MCHC RBC AUTO-ENTMCNC: 33.4 G/DL (ref 32–36)
MCV RBC AUTO: 85 FL (ref 82–98)
NUCLEATED RBC (/100WBC) (OHS): 0 /100 WBC
PLATELET # BLD AUTO: 186 K/UL (ref 150–450)
PMV BLD AUTO: 11.2 FL (ref 9.2–12.9)
POTASSIUM SERPL-SCNC: 3.9 MMOL/L (ref 3.5–5.1)
PROT SERPL-MCNC: 5.9 GM/DL (ref 6–8.4)
RBC # BLD AUTO: 5.01 M/UL (ref 4.6–6.2)
RELATIVE EOSINOPHIL (OHS): 1.5 %
RELATIVE LYMPHOCYTE (OHS): 20.2 % (ref 18–48)
RELATIVE MONOCYTE (OHS): 10.5 % (ref 4–15)
RELATIVE NEUTROPHIL (OHS): 66.8 % (ref 38–73)
SODIUM SERPL-SCNC: 137 MMOL/L (ref 136–145)
WBC # BLD AUTO: 6.77 K/UL (ref 3.9–12.7)

## 2025-07-10 PROCEDURE — 80053 COMPREHEN METABOLIC PANEL: CPT

## 2025-07-10 PROCEDURE — 97116 GAIT TRAINING THERAPY: CPT

## 2025-07-10 PROCEDURE — 99233 SBSQ HOSP IP/OBS HIGH 50: CPT | Mod: GC,,, | Performed by: PSYCHIATRY & NEUROLOGY

## 2025-07-10 PROCEDURE — 36415 COLL VENOUS BLD VENIPUNCTURE: CPT

## 2025-07-10 PROCEDURE — 85025 COMPLETE CBC W/AUTO DIFF WBC: CPT

## 2025-07-10 PROCEDURE — 25000003 PHARM REV CODE 250

## 2025-07-10 PROCEDURE — 25000003 PHARM REV CODE 250: Performed by: NURSE PRACTITIONER

## 2025-07-10 RX ADMIN — ASPIRIN 81 MG: 81 TABLET, COATED ORAL at 09:07

## 2025-07-10 RX ADMIN — CLOPIDOGREL 75 MG: 75 TABLET ORAL at 09:07

## 2025-07-10 RX ADMIN — ATORVASTATIN CALCIUM 40 MG: 40 TABLET, FILM COATED ORAL at 09:07

## 2025-07-10 RX ADMIN — PANTOPRAZOLE SODIUM 40 MG: 40 TABLET, DELAYED RELEASE ORAL at 09:07

## 2025-07-10 NOTE — SUBJECTIVE & OBJECTIVE
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    HPI, Past Medical, Family, and Social History remains the same as documented in the initial encounter.     Review of Systems   Neurological:  Positive for facial asymmetry (slight L lower facial palsy). Negative for speech difficulty, weakness and headaches.     Scheduled Meds:   aspirin  81 mg Oral Daily    atorvastatin  40 mg Oral Daily    clopidogreL  75 mg Oral Daily    mupirocin   Nasal BID    pantoprazole  40 mg Oral Daily     PRN Meds:  Current Facility-Administered Medications:     acetaminophen, 650 mg, Oral, Q6H PRN    baclofen, 5 mg, Oral, TID PRN    bisacodyL, 10 mg, Rectal, Daily PRN    dextrose 50%, 12.5 g, Intravenous, PRN    fentaNYL, 25 mcg, Intravenous, Q5 Min PRN    glucagon (human recombinant), 1 mg, Intramuscular, PRN    haloperidol lactate, 0.5 mg, Intravenous, Q10 Min PRN    hydrALAZINE, 10 mg, Intravenous, Q6H PRN    ondansetron, 4 mg, Intravenous, Q12H PRN    sodium chloride 0.9%, 500 mL, Intravenous, PRN    sodium chloride 0.9%, 10 mL, Intravenous, PRN    sodium chloride 0.9%, 10 mL, Intravenous, PRN    sodium chloride 0.9%, 10 mL, Intravenous, PRN    Objective:     Vital Signs (Most Recent):  Temp: 98.1 °F (36.7 °C) (07/10/25 0730)  Pulse: 66 (07/10/25 1055)  Resp: 18 (07/10/25 0730)  BP: (!) 165/95 (07/10/25 0730)  SpO2: 96 % (07/10/25 0730)  BP Location: Left arm    Vital Signs Range (Last 24H):  Temp:  [97.2 °F (36.2 °C)-98.3 °F (36.8 °C)]   Pulse:  [46-66]   Resp:  [12-18]   BP: (130-189)/()   SpO2:  [92 %-97 %]   BP Location: Left arm       Physical Exam  Vitals and nursing note reviewed.   HENT:      Head: Normocephalic.   Eyes:      Pupils: Pupils are equal, round, and reactive to light.   Pulmonary:      Effort: Pulmonary effort is normal. No respiratory  distress.   Abdominal:      Comments: No hematoma   Skin:     General: Skin is warm and dry.   Neurological:      Mental Status: He is alert and oriented to person, place, and time.          Neurological Exam:   LOC: alert  Attention Span: Good   Language: No aphasia  Articulation: No dysarthria  Orientation: Person, Place, Time   Visual Fields: Full  EOM (CN III, IV, VI): Full/intact  Pupils (CN II, III): PERRL  Facial Movement (CN VII): Lower facial weakness on the Left  Motor: Arm left  Normal 5/5  Leg left  Normal 5/5  Arm right  Normal 5/5  Leg right Normal 5/5  Cerebellum: No evidence of appendicular or axial ataxia  Sensation: intact to light touch    Laboratory:  CMP:   Recent Labs   Lab 07/10/25  0318   CALCIUM 8.4*   ALBUMIN 3.4*   PROT 5.9*      K 3.9   CO2 20*      BUN 18   CREATININE 1.8*   ALKPHOS 43   ALT 6*   AST 16   BILITOT 1.1*     BMP:   Recent Labs   Lab 07/10/25  0318      K 3.9      CO2 20*   BUN 18   CREATININE 1.8*   CALCIUM 8.4*     CBC:   Recent Labs   Lab 07/10/25  0318   WBC 6.77   RBC 5.01   HGB 14.3   HCT 42.8      MCV 85   MCH 28.5   MCHC 33.4     Lipid Panel:   Recent Labs   Lab 07/06/25  1630   CHOL 222*   LDLCALC 136.0   HDL 50   TRIG 180*     Coagulation:   Recent Labs   Lab 07/07/25  0532   INR 1.0   APTT 27.3     Hgb A1C:   Recent Labs   Lab 07/06/25  1632   HGBA1C 5.3     TSH:   Recent Labs   Lab 07/06/25  1630   TSH 1.861       Diagnostic Results     Brain Imaging   MRI Brain w/o  - Acute infarction of the right cerebral hemisphere involving the right caudate head, right amygdala, right subinsular cortex, and portions of the right frontal cortex.  - No evidence of hemorrhagic conversion, mass effect, or midline shift.    CT Head 07/06/2025  Acute/subacute infarct right head of the caudate nucleus.     Vessel Imaging  Diagnostic angiography 7/9/2025  Preliminary interpretation:   Right supra-clinoid ICA occlusion with no EC-IC collateral  anastomosis through Ophthalmic.  2. Delayed Right QUETA/MCA is filled via A-comm.     CTA Brain 07/06/2025  1. Occluded distal right ICA.  2. Severe stenosis right and left M2 segments.  3. Subjectively decreased enhancement the right sylvian branches.     CTA Neck 07/06/2025  Tapered narrowing of the mid/distal ICA resulting in complete occlusion.    Cardiac Imaging   Echo     Left Ventricle: The left ventricle is normal in size. Normal wall thickness. There is eccentric hypertrophy. Mild global hypokinesis and regional wall motion abnormalities present. See diagram for wall motion findings. There is moderately reduced systolic function with a visually estimated ejection fraction of 35 - 40%. Ejection fraction is approximately 38%. Grade II diastolic dysfunction.    Right Ventricle: The right ventricle is normal in size measuring 2.8 cm. Wall thickness is normal. Systolic function is normal. Pacemaker lead present in the ventricle.    Left Atrium: The left atrium is moderately dilated    Right Atrium: Lead present in the right atrium.    Aortic Valve: The aortic valve is a trileaflet valve. There is mild aortic valve sclerosis. There is mild aortic regurgitation.    Pulmonary Artery: The estimated pulmonary artery systolic pressure is 28 mmHg.    IVC/SVC: Normal venous pressure at 3 mmHg.

## 2025-07-10 NOTE — CARE UPDATE
I have reviewed the chart of Yobani Walls who is hospitalized for the following:    Active Hospital Problems    Diagnosis    *Embolic stroke involving right carotid artery    Hemiparesis     Therapy to evaluate and treat       Other reduced mobility     Patient with Acute debility due to hemiplegia/hemiparesis. Latest AMPAC and GEMS scores have been reviewed. Evaluation for etiology is complete. Plan includes - Progressive mobility protocol initated  - PT/OT consulted  - Fall precautions in place  - Physical Medicine/Rehab consulted.       Hypokalemia     Pt hypokalemic  - replete to K > 4  - daily BMP      Chronic systolic congestive heart failure    Hyperlipidemia    History of MI (myocardial infarction)    History of muscle spasm    Hypertension    Stage 3a chronic kidney disease        Pippa Vu NP  [facility]

## 2025-07-10 NOTE — ANESTHESIA POSTPROCEDURE EVALUATION
Anesthesia Post Evaluation    Patient: Yobani Walls    Procedure(s) Performed: * No procedures listed *    Final Anesthesia Type: general      Patient location during evaluation: PACU  Patient participation: Yes- Able to Participate  Level of consciousness: awake and alert  Post-procedure vital signs: reviewed and stable  Pain management: adequate  Airway patency: patent    PONV status at discharge: No PONV  Anesthetic complications: no      Cardiovascular status: blood pressure returned to baseline  Respiratory status: unassisted  Hydration status: euvolemic  Follow-up not needed.              Vitals Value Taken Time   /95 07/10/25 07:30   Temp 36.7 °C (98.1 °F) 07/10/25 07:30   Pulse 66 07/10/25 10:55   Resp 18 07/10/25 07:30   SpO2 96 % 07/10/25 07:30         Event Time   Out of Recovery 07/09/2025 20:35:00         Pain/Erik Score: Pain Rating Prior to Med Admin: 10 (7/9/2025  9:10 PM)  Pain Rating Post Med Admin: 4 (7/9/2025  7:45 PM)  Erik Score: 10 (7/9/2025  4:45 PM)

## 2025-07-10 NOTE — PROGRESS NOTES
"[facility] ([facility])  Vascular Neurology  Comprehensive Stroke Center  Progress Note    Assessment/Plan:     * Embolic stroke involving right carotid artery  Yobani Walls is a 50 y.o. male with PMH of HTN, HLD,  multiple MI, CHF, CKD, prior stroke (with no residual deficits) that presents as a transfer from [facility] with R intracranial ICA and R M2 occlusion. LKW 9:00pm 07/05/2026. Patient presented to [facility] this morning with left- sided weakness and bizarre behavior. Wife states that patient "seemed drunk" last night, but this morning had difficulty with driving. Patient was evaluated via telestroke. NIH at that time was 3 for left sided drift and L neglect. CTH/CTA revealed occlusion of the distal intracranial ICA with M1 and QUETA being patent and subsequent reocclusion of the R  inferior M2 segment.  as well as established infarcts in the caudate head and foci in the temporal lobe. Patient was OOW for TNK, IR not recommended due to low NIHSS. Decision made to transfer patient to [facility] for [place] and close monitoring for decompensation. On arrival to floor at [facility], stroke team notified. On exam, patient has significant LUE drift, L facial droop, and dysarthria. NIHSS now 6. Patient stood up to go to the bathroom, nurse noted that at this time, patient nearly slumped to the ground due to severe LSW. STAT MRI, ASA load, and HOB Flat ordered. Patient has a defibrillator, device required interrogation prior to MRI.     Discussed worsening exam with VN attending. Will obtain MRI prior to making decision on thrombectomy. Delay in obtaining MRI due to need for device interrogation. Interrogation complete and device switched to MRI mode by cardiology fellow, however, MRI staff requested device information. Device information included in a care update by cardiology fellow, however, MRI staff then stated protocol requires device to be programmed in MRI, not on the floor. This caused further delay in " getting patient to MRI. Cardiology fellow and radiology on call notified to expedite MRI process.     Etiology unclear at this time given fluctuating presentation with imaging showing areas of dilatation - will compare with imaging from prior TIA at OSH.     7/10/2025 JEOVANYEON. Stable neuro exam. Appropriate for discharge home. Diagnostic angio shows chronic progressive stenosis of R ICA with EC-IC collateral anastomoses. Given the angiography findings, he is unlikely to benefit from stent placement and is at risk for reperfusion hemorrhage so will not proceed with intervention at this point. He has low EF and highest suspicion for cardioembolic etiology. He follows with cardiology and on GDMT. Continue DAPT and follow up with vascular neurology in 3-4 weeks.    Antithrombotics for secondary stroke prevention: Antiplatelets: Aspirin: 81 mg daily  Clopidogrel: 75 mg daily    Statins for secondary stroke prevention and hyperlipidemia, if present:   Statins: Atorvastatin- 40 mg daily    Aggressive risk factor modification: HTN, HLD, Diet, Exercise     Rehab efforts: The patient has been evaluated by a stroke team provider and the therapy needs have been fully considered based off the presenting complaints and exam findings. The following therapy evaluations are needed: PT evaluate and treat, OT evaluate and treat, SLP evaluate and treat, PM&R evaluate for appropriate placement    Diagnostics ordered/pending: Diagnostic angio pending    VTE prophylaxis: Heparin 5000 units SQ every 8 hours  Mechanical prophylaxis: Place SCDs    BP parameters: Infarct: No intervention, SBP <220    CKD (chronic kidney disease)  History of chronic kidney disease noted.     Plan:   - avoid nephrotoxic medications   - renally dose medications   - monitor with daily labs     Hypertension  Patient is on the following at home per chart review from care everywhere: amlodipine 10 mg qd, carvedilol 25 mg qd, lasix 40 mg qd, hydralazine 50 mg  unspecified number of times per day.     Plan:   - hold hypertension medications in setting of permissive hypertension, will add back as clinically indicated.     Hyperlipidemia  Patient is on the following at home per care everywhere chart review: rosuvastatin 40 mg qd, fenofibrate 145 mg unspecified number of times per day.     Plan:   - will substitute rosuvastatin for atorvastatin   - continue fenofibrate    CHF (congestive heart failure)  Patient has extensive history of congestive heart failure. Per chart review from care everywhere, he is on the following regimen at home: carvedilol 25 mg qd, entresto  mg, farxiga 10 mg qd, lasix 40 mg qd.     Plan:   - will hold GDMT initially in the setting of permissive hypertension. Will add back as clinically indicated.       7/7/2025 Stable neurological exam - NIHSS 6 with left facial palsy, LUE/LLE weakness, and mild dysarthria. Plan for diagnostic angiography to evaluate R MCA fusiform pattern seen on vessel imaging.  7/8/2025 Improved left hemiparesis particularly in the LUE. Slightly improved L facial palsy with mild dysarthria. Plan for diagnostic angio today, otherwise medically appropriate for discharge. Pt prefers home with outpatient therapy.  7/9/2025 NAEON. Neuro exam with L lower facial palsy and mild dysarthria. No hemiparesis. Diagnostic angio delayed to today with subsequent discharge home.  7/10/2025 NAEON. Stable neuro exam. Appropriate for discharge home. Diagnostic angio shows chronic progressive stenosis of R ICA with EC-IC collateral anastomoses. Given the angiography findings, he is unlikely to benefit from stent placement and is at risk for reperfusion hemorrhage so will not proceed with intervention at this point. He has low EF and highest suspicion for cardioembolic etiology at this time. He follows with cardiology and on GDMT. Continue DAPT and follow up with vascular neurology in 3-4 weeks.        STROKE DOCUMENTATION      NIH  Scale:  1a. Level of Consciousness: 0-->Alert, keenly responsive  1b. LOC Questions: 0-->Answers both questions correctly  1c. LOC Commands: 0-->Performs both tasks correctly  2. Best Gaze: 0-->Normal  3. Visual: 0-->No visual loss  4. Facial Palsy: 1-->Minor paralysis (flattened nasolabial fold, asymmetry on smiling)  5a. Motor Arm, Left: 0-->No drift, limb holds 90 (or 45) degrees for full 10 secs  5b. Motor Arm, Right: 0-->No drift, limb holds 90 (or 45) degrees for full 10 secs  6a. Motor Leg, Left: 0-->No drift, leg holds 30 degree position for full 5 secs  6b. Motor Leg, Right: 0-->No drift, leg holds 30 degree position for full 5 secs  7. Limb Ataxia: 0-->Absent  8. Sensory: 0-->Normal, no sensory loss  9. Best Language: 0-->No aphasia, normal  10. Dysarthria: 0-->Normal  11. Extinction and Inattention (formerly Neglect): 0-->No abnormality  Total (NIH Stroke Scale): 1       Modified Veronique Score: 0  Prakash Coma Scale:    ABCD2 Score:    [id number] Score:   HAS -BLED Score:   ICH Score:   Hunt & Hackett Classification:      Hemorrhagic change of an Ischemic Stroke: Does this patient have an ischemic stroke with hemorrhagic changes? No     Neurologic Chief Complaint: R carotid ICA embolism of unclear etiology    Subjective:     Interval History: Patient is seen for follow-up neurological assessment and treatment recommendations: See hospital course.    HPI, Past Medical, Family, and Social History remains the same as documented in the initial encounter.     Review of Systems   Neurological:  Positive for facial asymmetry (slight L lower facial palsy). Negative for speech difficulty, weakness and headaches.     Scheduled Meds:   aspirin  81 mg Oral Daily    atorvastatin  40 mg Oral Daily    clopidogreL  75 mg Oral Daily    mupirocin   Nasal BID    pantoprazole  40 mg Oral Daily     PRN Meds:  Current Facility-Administered Medications:     acetaminophen, 650 mg, Oral, Q6H PRN    baclofen, 5 mg, Oral, TID PRN     bisacodyL, 10 mg, Rectal, Daily PRN    dextrose 50%, 12.5 g, Intravenous, PRN    fentaNYL, 25 mcg, Intravenous, Q5 Min PRN    glucagon (human recombinant), 1 mg, Intramuscular, PRN    haloperidol lactate, 0.5 mg, Intravenous, Q10 Min PRN    hydrALAZINE, 10 mg, Intravenous, Q6H PRN    ondansetron, 4 mg, Intravenous, Q12H PRN    sodium chloride 0.9%, 500 mL, Intravenous, PRN    sodium chloride 0.9%, 10 mL, Intravenous, PRN    sodium chloride 0.9%, 10 mL, Intravenous, PRN    sodium chloride 0.9%, 10 mL, Intravenous, PRN    Objective:     Vital Signs (Most Recent):  Temp: 98.1 °F (36.7 °C) (07/10/25 0730)  Pulse: 66 (07/10/25 1055)  Resp: 18 (07/10/25 0730)  BP: (!) 165/95 (07/10/25 0730)  SpO2: 96 % (07/10/25 0730)  BP Location: Left arm    Vital Signs Range (Last 24H):  Temp:  [97.2 °F (36.2 °C)-98.3 °F (36.8 °C)]   Pulse:  [46-66]   Resp:  [12-18]   BP: (130-189)/()   SpO2:  [92 %-97 %]   BP Location: Left arm       Physical Exam  Vitals and nursing note reviewed.   HENT:      Head: Normocephalic.   Eyes:      Pupils: Pupils are equal, round, and reactive to light.   Pulmonary:      Effort: Pulmonary effort is normal. No respiratory distress.   Abdominal:      Comments: No hematoma   Skin:     General: Skin is warm and dry.   Neurological:      Mental Status: He is alert and oriented to person, place, and time.          Neurological Exam:   LOC: alert  Attention Span: Good   Language: No aphasia  Articulation: No dysarthria  Orientation: Person, Place, Time   Visual Fields: Full  EOM (CN III, IV, VI): Full/intact  Pupils (CN II, III): PERRL  Facial Movement (CN VII): Lower facial weakness on the Left  Motor: Arm left  Normal 5/5  Leg left  Normal 5/5  Arm right  Normal 5/5  Leg right Normal 5/5  Cerebellum: No evidence of appendicular or axial ataxia  Sensation: intact to light touch    Laboratory:  CMP:   Recent Labs   Lab 07/10/25  0318   CALCIUM 8.4*   ALBUMIN 3.4*   PROT 5.9*      K 3.9   CO2 20*   CL  110   BUN 18   CREATININE 1.8*   ALKPHOS 43   ALT 6*   AST 16   BILITOT 1.1*     BMP:   Recent Labs   Lab 07/10/25  0318      K 3.9      CO2 20*   BUN 18   CREATININE 1.8*   CALCIUM 8.4*     CBC:   Recent Labs   Lab 07/10/25  0318   WBC 6.77   RBC 5.01   HGB 14.3   HCT 42.8      MCV 85   MCH 28.5   MCHC 33.4     Lipid Panel:   Recent Labs   Lab 07/06/25  1630   CHOL 222*   LDLCALC 136.0   HDL 50   TRIG 180*     Coagulation:   Recent Labs   Lab 07/07/25  0532   INR 1.0   APTT 27.3     Hgb A1C:   Recent Labs   Lab 07/06/25  1632   HGBA1C 5.3     TSH:   Recent Labs   Lab 07/06/25  1630   TSH 1.861       Diagnostic Results     Brain Imaging   MRI Brain w/o  - Acute infarction of the right cerebral hemisphere involving the right caudate head, right amygdala, right subinsular cortex, and portions of the right frontal cortex.  - No evidence of hemorrhagic conversion, mass effect, or midline shift.    CT Head 07/06/2025  Acute/subacute infarct right head of the caudate nucleus.     Vessel Imaging  Diagnostic angiography 7/9/2025  Preliminary interpretation:   Right supra-clinoid ICA occlusion with no EC-IC collateral anastomosis through Ophthalmic.  2. Delayed Right QUETA/MCA is filled via A-comm.     CTA Brain 07/06/2025  1. Occluded distal right ICA.  2. Severe stenosis right and left M2 segments.  3. Subjectively decreased enhancement the right sylvian branches.     CTA Neck 07/06/2025  Tapered narrowing of the mid/distal ICA resulting in complete occlusion.    Cardiac Imaging   Echo     Left Ventricle: The left ventricle is normal in size. Normal wall thickness. There is eccentric hypertrophy. Mild global hypokinesis and regional wall motion abnormalities present. See diagram for wall motion findings. There is moderately reduced systolic function with a visually estimated ejection fraction of 35 - 40%. Ejection fraction is approximately 38%. Grade II diastolic dysfunction.    Right Ventricle: The right  ventricle is normal in size measuring 2.8 cm. Wall thickness is normal. Systolic function is normal. Pacemaker lead present in the ventricle.    Left Atrium: The left atrium is moderately dilated    Right Atrium: Lead present in the right atrium.    Aortic Valve: The aortic valve is a trileaflet valve. There is mild aortic valve sclerosis. There is mild aortic regurgitation.    Pulmonary Artery: The estimated pulmonary artery systolic pressure is 28 mmHg.    IVC/SVC: Normal venous pressure at 3 mmHg.      Godwin Campos MD  Comprehensive Stroke Center  Department of Vascular Neurology   [facility]

## 2025-07-10 NOTE — PLAN OF CARE
[facility] (Hospital)  Discharge Final Note    Primary Care Provider: Laurie Alston MD    Expected Discharge Date: 7/10/2025      Future Appointments   Date Time Provider Department Center   8/19/2025  9:00 AM Laura Kelly MD [id number]          Final Discharge Note (most recent)       Final Note - 07/10/25 1505          Final Note    Assessment Type Final Discharge Note     Anticipated Discharge Disposition Home or Self Care     What phone number can be called within the next 1-3 days to see how you are doing after discharge? [phone]     Hospital Resources/Appts/Education Provided Appointments scheduled and added to AVS                     Important Message from Medicare             Mehnaz Gutierrez RN  Ext [phone]

## 2025-07-10 NOTE — NURSING TRANSFER
Nursing Transfer Note      7/9/2025   8:37 PM    Nurse giving handoff: MARIVEL Mike  Nurse receiving handoff:Marievl Cevallos      Transfer To: [id number]    Transfer via stretcher    Transfer with cardiac monitoring    Transported by pct    Telemetry: Box Number [id number], Rate 47, and Rhythm Sinus Cecil  Order for Tele Monitor? Yes    Patient belongings transferred with patient: No    Chart send with patient: Yes    Notified: spouse    Patient reassessed at: 2030

## 2025-07-10 NOTE — SUBJECTIVE & OBJECTIVE
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    HPI, Past Medical, Family, and Social History remains the same as documented in the initial encounter.     Review of Systems   Neurological:  Positive for facial asymmetry (slight L lower facial palsy). Negative for speech difficulty, weakness and headaches.     Scheduled Meds:   aspirin  81 mg Oral Daily    atorvastatin  40 mg Oral Daily    clopidogreL  75 mg Oral Daily    mupirocin   Nasal BID    pantoprazole  40 mg Oral Daily     PRN Meds:  Current Facility-Administered Medications:     acetaminophen, 650 mg, Oral, Q6H PRN    baclofen, 5 mg, Oral, TID PRN    bisacodyL, 10 mg, Rectal, Daily PRN    dextrose 50%, 12.5 g, Intravenous, PRN    fentaNYL, 25 mcg, Intravenous, Q5 Min PRN    glucagon (human recombinant), 1 mg, Intramuscular, PRN    haloperidol lactate, 0.5 mg, Intravenous, Q10 Min PRN    hydrALAZINE, 10 mg, Intravenous, Q6H PRN    ondansetron, 4 mg, Intravenous, Q12H PRN    sodium chloride 0.9%, 500 mL, Intravenous, PRN    sodium chloride 0.9%, 10 mL, Intravenous, PRN    sodium chloride 0.9%, 10 mL, Intravenous, PRN    sodium chloride 0.9%, 10 mL, Intravenous, PRN    Objective:     Vital Signs (Most Recent):  Temp: 98.1 °F (36.7 °C) (07/10/25 0730)  Pulse: 66 (07/10/25 1055)  Resp: 18 (07/10/25 0730)  BP: (!) 165/95 (07/10/25 0730)  SpO2: 96 % (07/10/25 0730)  BP Location: Left arm    Vital Signs Range (Last 24H):  Temp:  [97.2 °F (36.2 °C)-98.3 °F (36.8 °C)]   Pulse:  [46-66]   Resp:  [12-18]   BP: (130-189)/()   SpO2:  [92 %-97 %]   BP Location: Left arm       Physical Exam  Vitals and nursing note reviewed.   HENT:      Head: Normocephalic.   Eyes:      Pupils: Pupils are equal, round, and reactive to light.   Pulmonary:      Effort: Pulmonary effort is normal. No respiratory  distress.   Skin:     General: Skin is warm and dry.   Neurological:      Mental Status: He is alert and oriented to person, place, and time.          Neurological Exam:   LOC: alert  Attention Span: Good   Language: No aphasia  Articulation: No dysarthria  Orientation: Person, Place, Time   Visual Fields: Full  EOM (CN III, IV, VI): Full/intact  Pupils (CN II, III): PERRL  Facial Movement (CN VII): Lower facial weakness on the Left  Motor: Arm left  Normal 5/5  Leg left  Normal 5/5  Arm right  Normal 5/5  Leg right Normal 5/5  Cerebellum: No evidence of appendicular or axial ataxia  Sensation: intact to light touch    Laboratory:  CMP:   Recent Labs   Lab 07/10/25  0318   CALCIUM 8.4*   ALBUMIN 3.4*   PROT 5.9*      K 3.9   CO2 20*      BUN 18   CREATININE 1.8*   ALKPHOS 43   ALT 6*   AST 16   BILITOT 1.1*     BMP:   Recent Labs   Lab 07/10/25  0318      K 3.9      CO2 20*   BUN 18   CREATININE 1.8*   CALCIUM 8.4*     CBC:   Recent Labs   Lab 07/10/25  0318   WBC 6.77   RBC 5.01   HGB 14.3   HCT 42.8      MCV 85   MCH 28.5   MCHC 33.4     Lipid Panel:   Recent Labs   Lab 07/06/25  1630   CHOL 222*   LDLCALC 136.0   HDL 50   TRIG 180*     Coagulation:   Recent Labs   Lab 07/07/25  0532   INR 1.0   APTT 27.3     Hgb A1C:   Recent Labs   Lab 07/06/25  1632   HGBA1C 5.3     TSH:   Recent Labs   Lab 07/06/25  1630   TSH 1.861       Diagnostic Results     Brain Imaging   MRI Brain w/o  - Acute infarction of the right cerebral hemisphere involving the right caudate head, right amygdala, right subinsular cortex, and portions of the right frontal cortex.  - No evidence of hemorrhagic conversion, mass effect, or midline shift.    CT Head 07/06/2025  Acute/subacute infarct right head of the caudate nucleus.     Vessel Imaging  Diagnostic angiography 7/9/2025  Preliminary interpretation:   Right supra-clinoid ICA occlusion with no EC-IC collateral anastomosis through Ophthalmic.  2. Delayed Right  QUETA/MCA is filled via A-comm.     CTA Brain 07/06/2025  1. Occluded distal right ICA.  2. Severe stenosis right and left M2 segments.  3. Subjectively decreased enhancement the right sylvian branches.     CTA Neck 07/06/2025  Tapered narrowing of the mid/distal ICA resulting in complete occlusion.    Cardiac Imaging   Echo     Left Ventricle: The left ventricle is normal in size. Normal wall thickness. There is eccentric hypertrophy. Mild global hypokinesis and regional wall motion abnormalities present. See diagram for wall motion findings. There is moderately reduced systolic function with a visually estimated ejection fraction of 35 - 40%. Ejection fraction is approximately 38%. Grade II diastolic dysfunction.    Right Ventricle: The right ventricle is normal in size measuring 2.8 cm. Wall thickness is normal. Systolic function is normal. Pacemaker lead present in the ventricle.    Left Atrium: The left atrium is moderately dilated    Right Atrium: Lead present in the right atrium.    Aortic Valve: The aortic valve is a trileaflet valve. There is mild aortic valve sclerosis. There is mild aortic regurgitation.    Pulmonary Artery: The estimated pulmonary artery systolic pressure is 28 mmHg.    IVC/SVC: Normal venous pressure at 3 mmHg.

## 2025-07-10 NOTE — DISCHARGE INSTRUCTIONS
Continue taking Aspirin 81mg and Plavix 75mg every day until instructed otherwise.     Continue taking your other medications as recommended by your cardiologist and PCP.    Please follow up with the vascular (stroke) neurologist in 3-4 weeks.    Please follow up with your cardiologist in the next 3-4 weeks.

## 2025-07-10 NOTE — PLAN OF CARE
Problem: Adult Inpatient Plan of Care  Goal: Plan of Care Review  Outcome: Progressing  Goal: Patient-Specific Goal (Individualized)  Outcome: Progressing     Problem: Stroke, Ischemic (Includes Transient Ischemic Attack)  Goal: Optimal Coping  Outcome: Progressing  Goal: Effective Bowel Elimination  Outcome: Progressing     Problem: Fall Injury Risk  Goal: Absence of Fall and Fall-Related Injury  Outcome: Progressing   Plan of care reviewed with pt and family. Pt voiced understanding. Pt AAOX4. No c/o during the night. No apparent distress noted. Surgical site to rt groin assessed with mild soft swelling noted. Pedal pulses palpable and skin warm to touch.  Bed in lowest position and locked, call light within reach, side rails X2, and slip resistant socks on at this time. Tele orders maintained. Call light in reach.

## 2025-07-10 NOTE — PLAN OF CARE
Problem: Adult Inpatient Plan of Care  Goal: Plan of Care Review  Outcome: Progressing  Goal: Patient-Specific Goal (Individualized)  Outcome: Progressing  Goal: Absence of Hospital-Acquired Illness or Injury  Outcome: Progressing  Goal: Optimal Comfort and Wellbeing  Outcome: Progressing  Goal: Readiness for Transition of Care  Outcome: Progressing     Problem: Stroke, Ischemic (Includes Transient Ischemic Attack)  Goal: Optimal Coping  Outcome: Progressing  Goal: Effective Bowel Elimination  Outcome: Progressing  Goal: Optimal Cerebral Tissue Perfusion  Outcome: Progressing  Goal: Optimal Cognitive Function  Outcome: Progressing  Goal: Improved Communication Skills  Outcome: Progressing  Goal: Optimal Functional Ability  Outcome: Progressing  Goal: Optimal Nutrition Intake  Outcome: Progressing  Goal: Effective Oxygenation and Ventilation  Outcome: Progressing  Goal: Improved Sensorimotor Function  Outcome: Progressing  Goal: Safe and Effective Swallow  Outcome: Progressing  Goal: Effective Urinary Elimination  Outcome: Progressing     Problem: Fall Injury Risk  Goal: Absence of Fall and Fall-Related Injury  Outcome: Progressing     Problem: Acute Kidney Injury/Impairment  Goal: Fluid and Electrolyte Balance  Outcome: Progressing  Goal: Improved Oral Intake  Outcome: Progressing  Goal: Effective Renal Function  Outcome: Progressing     Problem: Infection  Goal: Absence of Infection Signs and Symptoms  Outcome: Progressing     Problem: Skin Injury Risk Increased  Goal: Skin Health and Integrity  Outcome: Progressing     Problem: Wound  Goal: Optimal Coping  Outcome: Progressing  Goal: Optimal Functional Ability  Outcome: Progressing  Goal: Absence of Infection Signs and Symptoms  Outcome: Progressing  Goal: Improved Oral Intake  Outcome: Progressing  Goal: Optimal Pain Control and Function  Outcome: Progressing  Goal: Skin Health and Integrity  Outcome: Progressing  Goal: Optimal Wound Healing  Outcome:  Progressing

## 2025-07-10 NOTE — ASSESSMENT & PLAN NOTE
"Yobani Walls is a 50 y.o. male with PMH of HTN, HLD,  multiple MI, CHF, CKD, prior stroke (with no residual deficits) that presents as a transfer from [facility] with R intracranial ICA and R M2 occlusion. LKW 9:00pm 07/05/2026. Patient presented to [facility] this morning with left- sided weakness and bizarre behavior. Wife states that patient "seemed drunk" last night, but this morning had difficulty with driving. Patient was evaluated via telestroke. NIH at that time was 3 for left sided drift and L neglect. CTH/CTA revealed occlusion of the distal intracranial ICA with M1 and QUETA being patent and subsequent reocclusion of the R  inferior M2 segment.  as well as established infarcts in the caudate head and foci in the temporal lobe. Patient was OOW for TNK, IR not recommended due to low NIHSS. Decision made to transfer patient to [facility] for MRI and close monitoring for decompensation. On arrival to floor at [facility], stroke team notified. On exam, patient has significant LUE drift, L facial droop, and dysarthria. NIHSS now 6. Patient stood up to go to the bathroom, nurse noted that at this time, patient nearly slumped to the ground due to severe LSW. STAT MRI, ASA load, and HOB Flat ordered. Patient has a defibrillator, device required interrogation prior to MRI.     Discussed worsening exam with VN attending. Will obtain MRI prior to making decision on thrombectomy. Delay in obtaining MRI due to need for device interrogation. Interrogation complete and device switched to MRI mode by cardiology fellow, however, MRI staff requested device information. Device information included in a care update by cardiology fellow, however, MRI staff then stated protocol requires device to be programmed in MRI, not on the floor. This caused further delay in getting patient to MRI. Cardiology fellow and radiology on call notified to expedite MRI process.     Etiology unclear at this time given fluctuating presentation with imaging " showing areas of dilatation - will compare with imaging from prior TIA at OSH.     7/10/2025 GENA. Stable neuro exam. Appropriate for discharge home. Diagnostic angio shows chronic progressive stenosis of R ICA with EC-IC collateral anastomoses.    Antithrombotics for secondary stroke prevention: Antiplatelets: Aspirin: 81 mg daily  Clopidogrel: 75 mg daily    Statins for secondary stroke prevention and hyperlipidemia, if present:   Statins: Atorvastatin- 40 mg daily    Aggressive risk factor modification: HTN, HLD, Diet, Exercise     Rehab efforts: The patient has been evaluated by a stroke team provider and the therapy needs have been fully considered based off the presenting complaints and exam findings. The following therapy evaluations are needed: PT evaluate and treat, OT evaluate and treat, SLP evaluate and treat, PM&R evaluate for appropriate placement    Diagnostics ordered/pending: Diagnostic angio pending    VTE prophylaxis: Heparin 5000 units SQ every 8 hours  Mechanical prophylaxis: Place SCDs    BP parameters: Infarct: No intervention, SBP <220

## 2025-07-10 NOTE — NURSING
Pt transferred back to the floor via stretcher at this time. C/o pain 8/10 in back and right groin area. Palpable pulses in right groin area and palpable pedal pulses +2. Pt AAOx4; PARKER. Tele box in place. Will notify provider concerning c/o pain. See flowsheet for further assessment.

## 2025-07-10 NOTE — ASSESSMENT & PLAN NOTE
"Yobani Walls is a 50 y.o. male with PMH of HTN, HLD,  multiple MI, CHF, CKD, prior stroke (with no residual deficits) that presents as a transfer from [facility] with R intracranial ICA and R M2 occlusion. LKW 9:00pm 07/05/2026. Patient presented to [facility] this morning with left- sided weakness and bizarre behavior. Wife states that patient "seemed drunk" last night, but this morning had difficulty with driving. Patient was evaluated via telestroke. NIH at that time was 3 for left sided drift and L neglect. CTH/CTA revealed occlusion of the distal intracranial ICA with M1 and QUETA being patent and subsequent reocclusion of the R  inferior M2 segment.  as well as established infarcts in the caudate head and foci in the temporal lobe. Patient was OOW for TNK, IR not recommended due to low NIHSS. [place] [facility] [place] and close monitoring for decompensation. [place] [facility] team notified. On exam, patient has significant LUE drift, L facial droop, and dysarthria. NIHSS now 6. Patient stood up to go to the bathroom, nurse noted that at this time, patient nearly slumped to the ground due to severe LSW. STAT MRI, ASA load, and HOB Flat ordered. Patient has a defibrillator, device required interrogation prior to MRI.     Discussed worsening exam with VN attending. Will obtain MRI prior to making decision on thrombectomy. Delay in obtaining MRI due to need for device interrogation. Interrogation complete and device switched to MRI mode by cardiology fellow, however, MRI staff requested device information. Device information included in a care update by cardiology fellow, however, MRI staff then stated protocol requires device to be programmed in MRI, not on the floor. This caused further delay in getting patient to MRI. Cardiology fellow and radiology on call notified to expedite MRI process.     Etiology unclear at this time given fluctuating presentation with imaging " showing areas of dilatation - will compare with imaging from prior TIA at OSH.     7/10/2025 GENA. Stable neuro exam. Appropriate for discharge home. Diagnostic angio shows chronic progressive stenosis of R ICA with EC-IC collateral anastomoses. Given the angiography findings, he is unlikely to benefit from stent placement and is at risk for reperfusion hemorrhage so will not proceed with intervention at this point. He has low EF and highest suspicion for cardioembolic etiology. He follows with cardiology and on GDMT. Continue DAPT and follow up with vascular neurology in 3-4 weeks.    Antithrombotics for secondary stroke prevention: Antiplatelets: Aspirin: 81 mg daily  Clopidogrel: 75 mg daily    Statins for secondary stroke prevention and hyperlipidemia, if present:   Statins: Atorvastatin- 40 mg daily    Aggressive risk factor modification: HTN, HLD, Diet, Exercise     Rehab efforts: The patient has been evaluated by a stroke team provider and the therapy needs have been fully considered based off the presenting complaints and exam findings. The following therapy evaluations are needed: PT evaluate and treat, OT evaluate and treat, SLP evaluate and treat, PM&R evaluate for appropriate placement    Diagnostics ordered/pending: Diagnostic angio pending    VTE prophylaxis: Heparin 5000 units SQ every 8 hours  Mechanical prophylaxis: Place SCDs    BP parameters: Infarct: No intervention, SBP <220

## 2025-07-10 NOTE — PT/OT/SLP PROGRESS
"Physical Therapy Treatment    Patient Name:  Yobani Walls   MRN:  [id number]    Recommendations:     Discharge Recommendations: Low Intensity Therapy  Discharge Equipment Recommendations: none  Barriers to discharge: None    Assessment:     Yobani aWlls is a 50 y.o. male admitted with a medical diagnosis of Embolic stroke involving right carotid artery.  He presents with the following impairments/functional limitations: impaired functional mobility, gait instability, impaired endurance, impaired balance     Pt receptive and tolerated PT treatment well. Pt able to amb ~300 ft with SBA and no AD this session needing verbal cues to decrease lateral sway.  Patient continues to demonstrate the need for low intensity therapy on a scheduled basis exhibited by decreased independence with self-care and functional mobility    Rehab Prognosis: Good; patient would benefit from acute skilled PT services to address these deficits and reach maximum level of function.    Recent Surgery: * No surgery found *      Plan:     During this hospitalization, patient to be seen 4 x/week to address the identified rehab impairments via gait training, therapeutic activities, therapeutic exercises, neuromuscular re-education and progress toward the following goals:    Plan of Care Expires:  07/21/25    Subjective     Chief Complaint: none reported  Patient/Family Comments/goals: "I can't go running today?"  Pain/Comfort:  Pain Rating 1: 0/10  Pain Rating Post-Intervention 1: 0/10      Objective:     Communicated with RN prior to session.  Patient found HOB elevated with bed alarm, telemetry upon PT entry to room.     General Precautions: Standard, fall  Orthopedic Precautions: N/A  Braces: N/A  Respiratory Status: Room air     Functional Mobility:    Bed Mobility:   Rolling: to L with supervision  Supine > Sit: supervision  Sit > Supine: supervision    Transfers:   Sit <> Stand Transfer: supervision from EOB without AD    Balance:   Sitting balance: " GOOD-: Incosistently Maintains balance through MODERATE excursions of active trunk movement,     Standing balance:   FAIR+: Takes MINIMAL challenges from all directions  FAIR+: Needs CLOSE SUPERVISION during gait and is able to right self with minor LOB                 Gait:  Distance: ~300 ft   Assistive Device: no AD  Assistance Level: stand by assistance  Gait Assessment: Pt amb with decreased robert, decreased step length and narrow PHIL with almost scissoring gait pattern and downward gaze leading to mild lateral sway.  Verbal cues for forward gaze and increased PHIL when amb      AM-PAC 6 CLICK MOBILITY  Turning over in bed (including adjusting bedclothes, sheets and blankets)?: 4  Sitting down on and standing up from a chair with arms (e.g., wheelchair, bedside commode, etc.): 4  Moving from lying on back to sitting on the side of the bed?: 4  Moving to and from a bed to a chair (including a wheelchair)?: 4  Need to walk in hospital room?: 3  Climbing 3-5 steps with a railing?: 3  Basic Mobility Total Score: 22       Treatment & Education:  Pt educated on tip to reduce fall risk and safety with mobility and using call button for assistance from nursing staff with OOB mobility.  Pt educated on sitting up in chair 2-4 hours of the day  Pt educated on amb 2-3x per day with assistance from staff and increased movement during hospital stay.  All questions answered within the scope of PT.  White board updated accordingly.    Patient left HOB elevated with all lines intact, call button in reach, and RN notified..    GOALS:   Multidisciplinary Problems       Physical Therapy Goals          Problem: Physical Therapy    Goal Priority Disciplines Outcome Interventions   Physical Therapy Goal     PT, PT/OT Progressing    Description: Goals to be met by: 25     Patient will increase functional independence with mobility by performin. Sit to stand transfer with [place]  2. Bed to chair transfer with  [place] using No Assistive Device  3. Gait  x 75 feet with [place] using No Assistive Device.   4. Stand for 5 minutes with Supervision using No Assistive Device                         DME Justifications:  No DME recommended requiring DME justifications    Time Tracking:     PT Received On: 07/10/25  PT Start Time: 1051     PT Stop Time: 1104  PT Total Time (min): 13 min     Billable Minutes: Gait Training 13    Treatment Type: Treatment  PT/PTA: PT     Number of PTA visits since last PT visit: 0     07/10/2025

## 2025-07-18 ENCOUNTER — PATIENT MESSAGE (OUTPATIENT)
Dept: NEUROLOGY | Facility: HOSPITAL | Age: 50
End: 2025-07-18
Payer: MEDICARE